# Patient Record
Sex: FEMALE | Race: OTHER
[De-identification: names, ages, dates, MRNs, and addresses within clinical notes are randomized per-mention and may not be internally consistent; named-entity substitution may affect disease eponyms.]

---

## 2020-01-22 ENCOUNTER — HOSPITAL ENCOUNTER (INPATIENT)
Dept: HOSPITAL 54 - ER | Age: 50
LOS: 16 days | Discharge: NURSING, REHAB OR LONG-TERM CARE | DRG: 876 | End: 2020-02-07
Attending: INTERNAL MEDICINE | Admitting: PSYCHIATRY & NEUROLOGY
Payer: MEDICARE

## 2020-01-22 VITALS — WEIGHT: 242 LBS | HEIGHT: 68 IN | BODY MASS INDEX: 36.68 KG/M2

## 2020-01-22 DIAGNOSIS — Z79.84: ICD-10-CM

## 2020-01-22 DIAGNOSIS — E03.9: ICD-10-CM

## 2020-01-22 DIAGNOSIS — L97.519: ICD-10-CM

## 2020-01-22 DIAGNOSIS — E11.65: ICD-10-CM

## 2020-01-22 DIAGNOSIS — F09: ICD-10-CM

## 2020-01-22 DIAGNOSIS — E11.40: ICD-10-CM

## 2020-01-22 DIAGNOSIS — I10: ICD-10-CM

## 2020-01-22 DIAGNOSIS — D72.829: ICD-10-CM

## 2020-01-22 DIAGNOSIS — E11.621: ICD-10-CM

## 2020-01-22 DIAGNOSIS — F25.0: Primary | ICD-10-CM

## 2020-01-22 DIAGNOSIS — E86.1: ICD-10-CM

## 2020-01-22 DIAGNOSIS — Z79.899: ICD-10-CM

## 2020-01-22 DIAGNOSIS — E87.1: ICD-10-CM

## 2020-01-22 LAB
ALBUMIN SERPL BCP-MCNC: 3.4 G/DL (ref 3.4–5)
ALP SERPL-CCNC: 109 U/L (ref 46–116)
ALT SERPL W P-5'-P-CCNC: 13 U/L (ref 12–78)
APAP SERPL-MCNC: < 10 UG/ML (ref 10–30)
AST SERPL W P-5'-P-CCNC: 10 U/L (ref 15–37)
BASOPHILS # BLD AUTO: 0 /CMM (ref 0–0.2)
BASOPHILS NFR BLD AUTO: 0.3 % (ref 0–2)
BILIRUB DIRECT SERPL-MCNC: 0 MG/DL (ref 0–0.2)
BILIRUB SERPL-MCNC: 0.2 MG/DL (ref 0.2–1)
BUN SERPL-MCNC: 16 MG/DL (ref 7–18)
CALCIUM SERPL-MCNC: 9.2 MG/DL (ref 8.5–10.1)
CHLORIDE SERPL-SCNC: 94 MMOL/L (ref 98–107)
CO2 SERPL-SCNC: 30 MMOL/L (ref 21–32)
CREAT SERPL-MCNC: 1 MG/DL (ref 0.6–1.3)
EOSINOPHIL NFR BLD AUTO: 2.9 % (ref 0–6)
ETHANOL SERPL-MCNC: < 3 MG/DL (ref 0–0)
GLUCOSE SERPL-MCNC: 134 MG/DL (ref 74–106)
HCT VFR BLD AUTO: 33 % (ref 33–45)
HGB BLD-MCNC: 10.3 G/DL (ref 11.5–14.8)
LYMPHOCYTES NFR BLD AUTO: 2.6 /CMM (ref 0.8–4.8)
LYMPHOCYTES NFR BLD AUTO: 22.2 % (ref 20–44)
MCHC RBC AUTO-ENTMCNC: 32 G/DL (ref 31–36)
MCV RBC AUTO: 78 FL (ref 82–100)
MONOCYTES NFR BLD AUTO: 1.2 /CMM (ref 0.1–1.3)
MONOCYTES NFR BLD AUTO: 10.2 % (ref 2–12)
NEUTROPHILS # BLD AUTO: 7.4 /CMM (ref 1.8–8.9)
NEUTROPHILS NFR BLD AUTO: 64.4 % (ref 43–81)
PH UR STRIP: 6.5 [PH] (ref 5–8)
PLATELET # BLD AUTO: 321 /CMM (ref 150–450)
POTASSIUM SERPL-SCNC: 3.8 MMOL/L (ref 3.5–5.1)
PROT SERPL-MCNC: 8.4 G/DL (ref 6.4–8.2)
RBC # BLD AUTO: 4.19 MIL/UL (ref 4–5.2)
SALICYLATES SERPL-MCNC: 0.2 MG/DL (ref 2.8–20)
SODIUM SERPL-SCNC: 129 MMOL/L (ref 136–145)
UROBILINOGEN UR STRIP-MCNC: 0.2 EU/DL
WBC NRBC COR # BLD AUTO: 11.5 K/UL (ref 4.3–11)

## 2020-01-22 PROCEDURE — G0480 DRUG TEST DEF 1-7 CLASSES: HCPCS

## 2020-01-22 RX ADMIN — TEMAZEPAM PRN MG: 7.5 CAPSULE ORAL at 23:57

## 2020-01-22 NOTE — NUR
PT TRANSPORTED TO UNIT ON GURNET WITH EMT AT BEDSIDE. PT IS STABLE FOPR 
TRANSPORT. NAD NOTED. IV LINE DISCONTINUED CATH INTACT.

## 2020-01-22 NOTE — NUR
GPS RN ADMISSION NOTES:

RECEIVED 48 Y/O FEMALE PATIENT FROM ER DEPARTMENT. PATIENT ARRIVED ON THIS UNIT AT 2045 VIA 
STRETCHER BY 1 NURSE. PATIENT ADMITTED ON A 5150 HOLD FOR DTS AND GD. PER HOLD PATIENT 
ARRIVED TO [facility] GPS UNIT DUE TO UNCONTROLLABLE CRYING AND RAMBLING. PT WAS REPORTED GOING IN 
OTHER PTS ROOM WITH NO SENSE OF PERSONAL BOUNDARY. PT HAS HISTORY OF DELUSIONAL THOUGHTS AND 
CONFUSION. STAFF FROM THE FACILITY THE PT WAS FROM WERE CONCERNED FOR PT SAFETY DUE TO HER 
MENTAL CONDITION. UPON FACE TO FACE ASSESSMENT PATIENT NOTED ANXIOUS, COOPERATIVE, 
HYPERVERBAL, DEPRESSED, FORGETFUL, CONFUSED, AND NEEDY .  PT DENIES S/I AND H/I AT THIS 
TIME. PATIENT IS CURRENTLY LYING IN BED AWAKE. NO S/S OF RESP DISTRESS. BREATHING EVEN AND 
UNLABORED. PT HAS NO S/S OF PAIN. PT IS ALERT AND ORIENTED X 3 ON ROOM AIR. PATIENT HAS NO 
NEEDS AT THIS TIME. PATIENT UNABLE TO SIGN PAPERWORK DUE TO CONDITION. PATIENT ADVISED OF 
HIS HOLD AND PATIENT RIGHTS HANDBOOK GIVEN. PATIENTS BELONGINGS WERE INVENTORIED AND CHECKED 
FOR CONTRABAND. PATIENT ADVANCED DIRECTIVE PREFERENCES AND NECESSARY PAPERWORK COMPLETED. 
PATIENT SKIN ASSESSMENT COMPLETED WITH PICTURES TAKEN IN PTS CHART. PT NOTED WITH CALLOUS ON 
BOTH RIGHT AND LEFT FOOT THUMB. WOUND CONSULT ORDERED FOR THE MORNING FOR FURTHER 
ASSESSMENT.  PT ORIENTED TO ROOM, FLOOR, AND STAFF WITH ALL QUESTIONS ANSWERED. PATIENT 
EDUCATED ON THE USE OF CALL BELL. PATIENTS SIDERAILS ARE UP X2 FOR SAFETY. INITIAL BLOOD 
SUGAR CHECK DONE. MRSA SWAB DONE IN THE ER DEPARTMENT. NOTIFIED PSYCH DR FLYNN REGARDING 
PT ADMISSION. MED RECON ADVISED TO [facility] ON CALL . PTS SISTER (MAGDALENO) AWARE OF PT 
ADMISSION VIA TELEPHONE NUMBER [phone]. PATIENTS BED LOCKED, LOW AND I WILL CONTINUE TO 
MONITOR THIS PATIENT Q15 MIN WITH THE HELP OF THE STAFF TO MAINTAIN SAFETY.

## 2020-01-22 NOTE — NUR
SUSANA FERGUSON 49 YEAR OLD FEMALE FROM A FACILITY FOR MEDICAL CLEARANCE TO BE PLACED ON 
A HOLD. ALERT AND ORIENTED X2 BREATHING EVEN AND UNLABORED. PT SEEM TO BE IN 
DISTRESS AND CRYING. PER AMBULANCE THAT IS HER BASAE LINE. WAITING TO BE SEEN 
BY MD.

## 2020-01-23 VITALS — SYSTOLIC BLOOD PRESSURE: 166 MMHG | DIASTOLIC BLOOD PRESSURE: 75 MMHG

## 2020-01-23 VITALS — SYSTOLIC BLOOD PRESSURE: 150 MMHG | DIASTOLIC BLOOD PRESSURE: 90 MMHG

## 2020-01-23 VITALS — DIASTOLIC BLOOD PRESSURE: 57 MMHG | SYSTOLIC BLOOD PRESSURE: 124 MMHG

## 2020-01-23 LAB
ALBUMIN SERPL BCP-MCNC: 3.2 G/DL (ref 3.4–5)
ALP SERPL-CCNC: 106 U/L (ref 46–116)
ALT SERPL W P-5'-P-CCNC: 16 U/L (ref 12–78)
AST SERPL W P-5'-P-CCNC: 10 U/L (ref 15–37)
BILIRUB SERPL-MCNC: 0.2 MG/DL (ref 0.2–1)
BUN SERPL-MCNC: 13 MG/DL (ref 7–18)
CALCIUM SERPL-MCNC: 9.6 MG/DL (ref 8.5–10.1)
CHLORIDE SERPL-SCNC: 102 MMOL/L (ref 98–107)
CHOLEST SERPL-MCNC: 122 MG/DL (ref ?–200)
CO2 SERPL-SCNC: 29 MMOL/L (ref 21–32)
CREAT SERPL-MCNC: 0.7 MG/DL (ref 0.6–1.3)
GLUCOSE SERPL-MCNC: 150 MG/DL (ref 74–106)
HDLC SERPL-MCNC: 45 MG/DL (ref 40–60)
LDLC SERPL DIRECT ASSAY-MCNC: 62 MG/DL (ref 0–99)
MAGNESIUM SERPL-MCNC: 1.6 MG/DL (ref 1.8–2.4)
PHOSPHATE SERPL-MCNC: 4.6 MG/DL (ref 2.5–4.9)
POTASSIUM SERPL-SCNC: 4.5 MMOL/L (ref 3.5–5.1)
PROT SERPL-MCNC: 8.1 G/DL (ref 6.4–8.2)
SODIUM SERPL-SCNC: 138 MMOL/L (ref 136–145)
TRIGL SERPL-MCNC: 104 MG/DL (ref 30–150)
TSH SERPL DL<=0.005 MIU/L-ACNC: 7.12 UIU/ML (ref 0.36–3.74)
URATE SERPL-MCNC: 4.6 MG/DL (ref 2.6–7.2)

## 2020-01-23 RX ADMIN — INSULIN GLARGINE SCH UNIT: 100 INJECTION, SOLUTION SUBCUTANEOUS at 22:43

## 2020-01-23 RX ADMIN — LOSARTAN POTASSIUM SCH MG: 50 TABLET, FILM COATED ORAL at 08:34

## 2020-01-23 RX ADMIN — METFORMIN HYDROCHLORIDE SCH MG: 500 TABLET, FILM COATED ORAL at 08:33

## 2020-01-23 RX ADMIN — GLIPIZIDE SCH MG: 2.5 TABLET, EXTENDED RELEASE ORAL at 08:34

## 2020-01-23 RX ADMIN — ACETAMINOPHEN PRN MG: 325 TABLET ORAL at 08:33

## 2020-01-23 RX ADMIN — POTASSIUM CHLORIDE SCH MEQ: 1500 TABLET, EXTENDED RELEASE ORAL at 08:33

## 2020-01-23 RX ADMIN — LORAZEPAM PRN MG: 1 TABLET ORAL at 18:46

## 2020-01-23 RX ADMIN — DIVALPROEX SODIUM SCH MG: 500 TABLET, DELAYED RELEASE ORAL at 21:44

## 2020-01-23 RX ADMIN — LEVOTHYROXINE SODIUM SCH MCG: 137 TABLET ORAL at 08:34

## 2020-01-23 RX ADMIN — TEMAZEPAM PRN MG: 7.5 CAPSULE ORAL at 22:47

## 2020-01-23 RX ADMIN — LORAZEPAM PRN MG: 1 TABLET ORAL at 21:54

## 2020-01-23 RX ADMIN — GLIPIZIDE SCH MG: 2.5 TABLET, EXTENDED RELEASE ORAL at 16:16

## 2020-01-23 RX ADMIN — HALOPERIDOL SCH MG: 1 TABLET ORAL at 14:23

## 2020-01-23 RX ADMIN — DIVALPROEX SODIUM SCH MG: 250 TABLET, DELAYED RELEASE ORAL at 16:16

## 2020-01-23 RX ADMIN — BENZTROPINE MESYLATE SCH MG: 1 TABLET ORAL at 16:16

## 2020-01-23 RX ADMIN — LINAGLIPTIN SCH MG: 5 TABLET, FILM COATED ORAL at 08:34

## 2020-01-23 RX ADMIN — METFORMIN HYDROCHLORIDE SCH MG: 500 TABLET, FILM COATED ORAL at 17:15

## 2020-01-23 RX ADMIN — HALOPERIDOL SCH MG: 1 TABLET ORAL at 16:16

## 2020-01-23 RX ADMIN — PANTOPRAZOLE SODIUM SCH MG: 40 TABLET, DELAYED RELEASE ORAL at 08:34

## 2020-01-23 RX ADMIN — INSULIN GLARGINE SCH UNIT: 100 INJECTION, SOLUTION SUBCUTANEOUS at 00:00

## 2020-01-23 RX ADMIN — FERROUS SULFATE TAB 325 MG (65 MG ELEMENTAL FE) SCH MG: 325 (65 FE) TAB at 08:33

## 2020-01-23 RX ADMIN — OLANZAPINE SCH MG: 5 TABLET, FILM COATED ORAL at 14:23

## 2020-01-23 RX ADMIN — LITHIUM CARBONATE SCH MG: 300 TABLET, FILM COATED, EXTENDED RELEASE ORAL at 16:16

## 2020-01-23 RX ADMIN — OLANZAPINE SCH MG: 10 TABLET ORAL at 21:44

## 2020-01-23 RX ADMIN — TEMAZEPAM PRN MG: 7.5 CAPSULE ORAL at 21:37

## 2020-01-23 NOTE — NUR
FACILITY CONTACT: RADHA contacted [facility] [address] [place] [id number] [phone] and was unable to leave a voicemail for callback.

## 2020-01-23 NOTE — NUR
INITIAL DISCHARGE PLAN: Per sister Bridgette [phone] she wishes for pt to be discharged 
to a higher level of care such a SNF closer to [facility] but is willing to accept 
pt returning to [facility] [address] [place] [address] [id number] [phone] if SW is unable to find appropriate placement. RADHA will help form a safe and 
proper discharge in collaboration with MD.

## 2020-01-23 NOTE — NUR
FAMILY CONTACT: SW received a call from pts sister Bridgette [phone] providing SW with 
collateral information. Per sister she stated pt is originally from [place] [facility] and 
states that she wishes for pt to be sent back to [facility] but she does not have placement 
for pt. Sister stated that pt needs a higher level of care and states that [facility] [address] [place] [address] [phone] cannot properly 
manage pt. Sister stated that pt has been dual diagnosed as Developmentally Delayed and 
Schizophrenic however pt was not diagnosed as developmentally delayed before the age of 17 
due to pts father being in denial and refusing to seek help for pt. Therefore, she does not 
qualify for [facility] services. Sister stated that pt acts like a 8 year old in a 49 
year old body and has attempted several times to apply for conservatorship but has not been 
successful. SW informed her that she will consult with MD regarding appropriate placement 
and stated that in order for pt to be conserved she has to be residing in the county she 
originates from. SW also informed her that she will attempt to get pt placed closer to [facility] but informed her that the chances of pt being accepted to a SNF out of that area were 
low. Sister understood.

## 2020-01-23 NOTE — NUR
GPS RN NOTES:

PT REFUSED INSULIN LANTUS AS ORDERED 0000. PT STATED, "I WANT TO SLEEP. THANK YOU." EXPLAIN 
RISKS AND BENEFITS. STILL REFUSED X3. CONTINUE TO MONITOR.

## 2020-01-23 NOTE — NUR
GPS RN NOTES:

PT C/O UNABLE TO GO TO SLEEP. PT REQUESTED SLEEPING PILLS. OFFERED RESTORIL 7.5MG PO AS 
ORDERED. PT AGREED AND TOLERATED MEDICATION WELL. CONTINUE TO MONITOR.

## 2020-01-24 VITALS — SYSTOLIC BLOOD PRESSURE: 153 MMHG | DIASTOLIC BLOOD PRESSURE: 74 MMHG

## 2020-01-24 VITALS — DIASTOLIC BLOOD PRESSURE: 71 MMHG | SYSTOLIC BLOOD PRESSURE: 129 MMHG

## 2020-01-24 VITALS — SYSTOLIC BLOOD PRESSURE: 143 MMHG | DIASTOLIC BLOOD PRESSURE: 70 MMHG

## 2020-01-24 LAB
OSMOLALITY UR: 143 MOS/KG (ref 340–1090)
SODIUM UR-SCNC: 27 MMOL/L (ref 40–220)

## 2020-01-24 RX ADMIN — DIVALPROEX SODIUM SCH MG: 250 TABLET, DELAYED RELEASE ORAL at 08:28

## 2020-01-24 RX ADMIN — DIVALPROEX SODIUM SCH MG: 500 TABLET, DELAYED RELEASE ORAL at 22:22

## 2020-01-24 RX ADMIN — INSULIN HUMAN PRN UNIT: 100 INJECTION, SOLUTION PARENTERAL at 18:11

## 2020-01-24 RX ADMIN — HALOPERIDOL SCH MG: 1 TABLET ORAL at 08:27

## 2020-01-24 RX ADMIN — HALOPERIDOL SCH MG: 1 TABLET ORAL at 16:14

## 2020-01-24 RX ADMIN — GLIPIZIDE SCH MG: 2.5 TABLET, EXTENDED RELEASE ORAL at 16:13

## 2020-01-24 RX ADMIN — BENZTROPINE MESYLATE SCH MG: 1 TABLET ORAL at 08:29

## 2020-01-24 RX ADMIN — ALUMINUM HYDROXIDE, MAGNESIUM HYDROXIDE, AND SIMETHICONE PRN ML: 200; 200; 20 SUSPENSION ORAL at 12:07

## 2020-01-24 RX ADMIN — LORAZEPAM PRN MG: 1 TABLET ORAL at 08:29

## 2020-01-24 RX ADMIN — GLIPIZIDE SCH MG: 2.5 TABLET, EXTENDED RELEASE ORAL at 08:28

## 2020-01-24 RX ADMIN — LINAGLIPTIN SCH MG: 5 TABLET, FILM COATED ORAL at 08:28

## 2020-01-24 RX ADMIN — LOSARTAN POTASSIUM SCH MG: 50 TABLET, FILM COATED ORAL at 08:28

## 2020-01-24 RX ADMIN — INSULIN GLARGINE SCH UNIT: 100 INJECTION, SOLUTION SUBCUTANEOUS at 22:00

## 2020-01-24 RX ADMIN — DIVALPROEX SODIUM SCH MG: 250 TABLET, DELAYED RELEASE ORAL at 13:17

## 2020-01-24 RX ADMIN — LORAZEPAM PRN MG: 1 TABLET ORAL at 16:13

## 2020-01-24 RX ADMIN — METFORMIN HYDROCHLORIDE SCH MG: 500 TABLET, FILM COATED ORAL at 08:29

## 2020-01-24 RX ADMIN — FERROUS SULFATE TAB 325 MG (65 MG ELEMENTAL FE) SCH MG: 325 (65 FE) TAB at 08:28

## 2020-01-24 RX ADMIN — LITHIUM CARBONATE SCH MG: 300 TABLET, FILM COATED, EXTENDED RELEASE ORAL at 08:29

## 2020-01-24 RX ADMIN — Medication SCH EACH: at 17:31

## 2020-01-24 RX ADMIN — OLANZAPINE SCH MG: 5 TABLET, FILM COATED ORAL at 08:29

## 2020-01-24 RX ADMIN — POTASSIUM CHLORIDE SCH MEQ: 1500 TABLET, EXTENDED RELEASE ORAL at 08:27

## 2020-01-24 RX ADMIN — LITHIUM CARBONATE SCH MG: 300 TABLET, FILM COATED, EXTENDED RELEASE ORAL at 13:17

## 2020-01-24 RX ADMIN — HALOPERIDOL SCH MG: 1 TABLET ORAL at 13:17

## 2020-01-24 RX ADMIN — PANTOPRAZOLE SODIUM SCH MG: 40 TABLET, DELAYED RELEASE ORAL at 08:28

## 2020-01-24 RX ADMIN — Medication SCH EACH: at 11:41

## 2020-01-24 RX ADMIN — ACETAMINOPHEN PRN MG: 325 TABLET ORAL at 09:38

## 2020-01-24 RX ADMIN — Medication SCH EACH: at 22:21

## 2020-01-24 RX ADMIN — METFORMIN HYDROCHLORIDE SCH MG: 500 TABLET, FILM COATED ORAL at 18:14

## 2020-01-24 RX ADMIN — MUPIROCIN SCH GM: 20 OINTMENT TOPICAL at 21:23

## 2020-01-24 RX ADMIN — Medication SCH EACH: at 07:40

## 2020-01-24 RX ADMIN — BENZTROPINE MESYLATE SCH MG: 1 TABLET ORAL at 16:14

## 2020-01-24 RX ADMIN — LITHIUM CARBONATE SCH MG: 300 TABLET, FILM COATED, EXTENDED RELEASE ORAL at 16:13

## 2020-01-24 RX ADMIN — OLANZAPINE SCH MG: 10 TABLET ORAL at 22:22

## 2020-01-24 RX ADMIN — Medication SCH EACH: at 18:03

## 2020-01-24 RX ADMIN — INSULIN HUMAN PRN UNIT: 100 INJECTION, SOLUTION PARENTERAL at 22:13

## 2020-01-24 RX ADMIN — LEVOTHYROXINE SODIUM SCH MCG: 137 TABLET ORAL at 08:27

## 2020-01-24 RX ADMIN — BENZTROPINE MESYLATE SCH MG: 1 TABLET ORAL at 13:17

## 2020-01-24 NOTE — NUR
INDIVIDUAL MEETING: SW met with pt on this present day and discussed pts discharge plan, SW 
informed her that [facility] is not accepting her back due to her behavior and 
informed her that SW will be referring pt to [facility] in [place] pt stated that 
she wanted to be closer to her sister in [place] and began crying uncontrollably. Pt 
has impaired insight and judgement and was unable to process information provided to her. Pt 
asked SW the same question 5 times in less than a 5 minute span.

## 2020-01-24 NOTE — NUR
FACILITY CONTACT: RADHA contacted Nafisa,  at [facility] Address: [address] 
[place] [address]  Phone: [phone] who stated pt cannot return to her 
facility due to pt not being a good fit and needing a higher level of care. Nafisa, 
recommended pt be discharged to [facility] .

## 2020-01-24 NOTE — NUR
FAMILY CONTACT: RADHA contacted pts sister Bridgette [phone] and informed her [facility] Address: [address] [place] [address]  Phone: [phone] is not accepting 
pt back and stated that they wish for pt to be referred to [facility] in [place]. 
Sister stated that [place] was too far and that she wished for pt to be closer to her. SW 
provided her with [place], [place], [place], and [place] SNF placement options. 
Sister stated that those cities were perfect and agreed with SNF referrals.

## 2020-01-25 VITALS — SYSTOLIC BLOOD PRESSURE: 148 MMHG | DIASTOLIC BLOOD PRESSURE: 79 MMHG

## 2020-01-25 VITALS — DIASTOLIC BLOOD PRESSURE: 85 MMHG | SYSTOLIC BLOOD PRESSURE: 160 MMHG

## 2020-01-25 VITALS — SYSTOLIC BLOOD PRESSURE: 147 MMHG | DIASTOLIC BLOOD PRESSURE: 61 MMHG

## 2020-01-25 RX ADMIN — OLANZAPINE SCH MG: 10 TABLET ORAL at 21:13

## 2020-01-25 RX ADMIN — MUPIROCIN SCH GM: 20 OINTMENT TOPICAL at 21:17

## 2020-01-25 RX ADMIN — HALOPERIDOL SCH MG: 1 TABLET ORAL at 16:32

## 2020-01-25 RX ADMIN — INSULIN GLARGINE SCH UNIT: 100 INJECTION, SOLUTION SUBCUTANEOUS at 21:25

## 2020-01-25 RX ADMIN — ALUMINUM HYDROXIDE, MAGNESIUM HYDROXIDE, AND SIMETHICONE PRN ML: 200; 200; 20 SUSPENSION ORAL at 14:51

## 2020-01-25 RX ADMIN — DIVALPROEX SODIUM SCH MG: 250 TABLET, DELAYED RELEASE ORAL at 07:52

## 2020-01-25 RX ADMIN — POTASSIUM CHLORIDE SCH MEQ: 1500 TABLET, EXTENDED RELEASE ORAL at 08:41

## 2020-01-25 RX ADMIN — PANTOPRAZOLE SODIUM SCH MG: 40 TABLET, DELAYED RELEASE ORAL at 07:51

## 2020-01-25 RX ADMIN — LITHIUM CARBONATE SCH MG: 300 TABLET, FILM COATED, EXTENDED RELEASE ORAL at 16:32

## 2020-01-25 RX ADMIN — BENZTROPINE MESYLATE SCH MG: 1 TABLET ORAL at 13:20

## 2020-01-25 RX ADMIN — MUPIROCIN SCH GM: 20 OINTMENT TOPICAL at 09:37

## 2020-01-25 RX ADMIN — Medication SCH EACH: at 21:22

## 2020-01-25 RX ADMIN — INSULIN HUMAN PRN UNIT: 100 INJECTION, SOLUTION PARENTERAL at 21:24

## 2020-01-25 RX ADMIN — GLIPIZIDE SCH MG: 2.5 TABLET, EXTENDED RELEASE ORAL at 07:51

## 2020-01-25 RX ADMIN — GLIPIZIDE SCH MG: 2.5 TABLET, EXTENDED RELEASE ORAL at 16:32

## 2020-01-25 RX ADMIN — Medication SCH EACH: at 16:56

## 2020-01-25 RX ADMIN — ACETAMINOPHEN PRN MG: 325 TABLET ORAL at 08:11

## 2020-01-25 RX ADMIN — OLANZAPINE SCH MG: 5 TABLET, FILM COATED ORAL at 08:40

## 2020-01-25 RX ADMIN — HALOPERIDOL SCH MG: 1 TABLET ORAL at 13:20

## 2020-01-25 RX ADMIN — FERROUS SULFATE TAB 325 MG (65 MG ELEMENTAL FE) SCH MG: 325 (65 FE) TAB at 08:40

## 2020-01-25 RX ADMIN — LINAGLIPTIN SCH MG: 5 TABLET, FILM COATED ORAL at 08:40

## 2020-01-25 RX ADMIN — BENZTROPINE MESYLATE SCH MG: 1 TABLET ORAL at 08:39

## 2020-01-25 RX ADMIN — LITHIUM CARBONATE SCH MG: 300 TABLET, FILM COATED, EXTENDED RELEASE ORAL at 08:40

## 2020-01-25 RX ADMIN — ACETAMINOPHEN PRN MG: 325 TABLET ORAL at 14:51

## 2020-01-25 RX ADMIN — LOSARTAN POTASSIUM SCH MG: 50 TABLET, FILM COATED ORAL at 08:40

## 2020-01-25 RX ADMIN — LORAZEPAM PRN MG: 1 TABLET ORAL at 09:43

## 2020-01-25 RX ADMIN — METFORMIN HYDROCHLORIDE SCH MG: 500 TABLET, FILM COATED ORAL at 18:07

## 2020-01-25 RX ADMIN — DIVALPROEX SODIUM SCH MG: 500 TABLET, DELAYED RELEASE ORAL at 21:13

## 2020-01-25 RX ADMIN — LEVOTHYROXINE SODIUM SCH MCG: 137 TABLET ORAL at 07:52

## 2020-01-25 RX ADMIN — LITHIUM CARBONATE SCH MG: 300 TABLET, FILM COATED, EXTENDED RELEASE ORAL at 13:20

## 2020-01-25 RX ADMIN — BENZTROPINE MESYLATE SCH MG: 1 TABLET ORAL at 16:32

## 2020-01-25 RX ADMIN — TEMAZEPAM PRN MG: 7.5 CAPSULE ORAL at 21:14

## 2020-01-25 RX ADMIN — HALOPERIDOL SCH MG: 1 TABLET ORAL at 08:40

## 2020-01-25 RX ADMIN — Medication SCH EACH: at 07:51

## 2020-01-25 RX ADMIN — METFORMIN HYDROCHLORIDE SCH MG: 500 TABLET, FILM COATED ORAL at 08:41

## 2020-01-25 RX ADMIN — DIVALPROEX SODIUM SCH MG: 250 TABLET, DELAYED RELEASE ORAL at 13:20

## 2020-01-25 RX ADMIN — ACETAMINOPHEN PRN MG: 325 TABLET ORAL at 21:13

## 2020-01-25 RX ADMIN — Medication SCH EACH: at 11:53

## 2020-01-25 NOTE — NUR
PATIENT C/O OF INDIGESTION/NAUSEA AND BODY ACHE. PRN TYLENOL AND MAALOX ADMINISTERED AS 
ORDERED. WILL CONTINUE TO MONITOR Q15

## 2020-01-25 NOTE — NUR
GPS RN NOTE, RECEIVED PATIENT AWAKE AND IN BED, NO S/S OR COMPLAINTS OF PAIN AT THIS TIME.  
PATIENT IS DISPLAYING NO S/S OF APPARENT DISTRESS AT THIS TIME.  PATIENT BREATHING IS 
UNLABORED WITH EQUAL RISE AND FALL OF THE CHEST.  PATIENT IS ALERT AND ORIENTED X 3 ON ROOM 
AIR WITH A SPO2 95%.  PATIENT IS MED COMPLIANT, ANXIOUS AT TIMES, NEEDY, DEMANDING, LABILE, 
SUSPICIOUS, PARANOID, AND COOPERATIVE.  PATIENT DENIES SUICIDAL AND HOMICIDAL IDEATIONS AT 
THIS TIME.  PATIENT ASSISTED WITH TURNING AND REPOSITIONING Q2HR AND PRN FOR COMFORT AND 
CIRCULATION.  PATIENT HAS NO NEEDS AT THIS TIME.  PATIENT EDUCATED ON THE USE OF THE CALL 
BELL.  PATIENT BED SIDE RAILS UP X 2 FOR SAFETY.  PATIENT BED IS LOCKED, LOW, WITH BED ALARM 
ON.  WILL CONTINUE TO MONITOR THIS PATIENT Q15 MINUTES WITH THE HELP OF STAFF TO MAINTAIN 
SAFETY.

## 2020-01-25 NOTE — NUR
PATIENT COMPLAINING OF RACING THOUGHTS

PRESENTS WITH HYPERVERBAL SPEECH AND LABILE MOOD

ATIVAN ADMINISTERED PO 1MG AS ORDERED

## 2020-01-26 VITALS — DIASTOLIC BLOOD PRESSURE: 85 MMHG | SYSTOLIC BLOOD PRESSURE: 130 MMHG

## 2020-01-26 VITALS — SYSTOLIC BLOOD PRESSURE: 136 MMHG | DIASTOLIC BLOOD PRESSURE: 75 MMHG

## 2020-01-26 VITALS — SYSTOLIC BLOOD PRESSURE: 142 MMHG | DIASTOLIC BLOOD PRESSURE: 85 MMHG

## 2020-01-26 LAB — TSH SERPL DL<=0.005 MIU/L-ACNC: 5.7 UIU/ML (ref 0.36–3.74)

## 2020-01-26 RX ADMIN — METFORMIN HYDROCHLORIDE SCH MG: 500 TABLET, FILM COATED ORAL at 08:03

## 2020-01-26 RX ADMIN — METFORMIN HYDROCHLORIDE SCH MG: 500 TABLET, FILM COATED ORAL at 18:53

## 2020-01-26 RX ADMIN — LITHIUM CARBONATE SCH MG: 300 TABLET, FILM COATED, EXTENDED RELEASE ORAL at 16:10

## 2020-01-26 RX ADMIN — INSULIN GLARGINE SCH UNIT: 100 INJECTION, SOLUTION SUBCUTANEOUS at 22:19

## 2020-01-26 RX ADMIN — PANTOPRAZOLE SODIUM SCH MG: 40 TABLET, DELAYED RELEASE ORAL at 08:03

## 2020-01-26 RX ADMIN — LINAGLIPTIN SCH MG: 5 TABLET, FILM COATED ORAL at 08:04

## 2020-01-26 RX ADMIN — Medication SCH EACH: at 07:59

## 2020-01-26 RX ADMIN — LORAZEPAM PRN MG: 1 TABLET ORAL at 08:46

## 2020-01-26 RX ADMIN — LORAZEPAM PRN MG: 1 TABLET ORAL at 16:11

## 2020-01-26 RX ADMIN — ACETAMINOPHEN PRN MG: 325 TABLET ORAL at 08:46

## 2020-01-26 RX ADMIN — OLANZAPINE SCH MG: 5 TABLET, FILM COATED ORAL at 08:01

## 2020-01-26 RX ADMIN — Medication SCH EACH: at 21:24

## 2020-01-26 RX ADMIN — HALOPERIDOL SCH MG: 1 TABLET ORAL at 08:01

## 2020-01-26 RX ADMIN — MUPIROCIN SCH APPLIC: 20 OINTMENT TOPICAL at 21:24

## 2020-01-26 RX ADMIN — Medication SCH EACH: at 17:16

## 2020-01-26 RX ADMIN — BENZTROPINE MESYLATE SCH MG: 1 TABLET ORAL at 12:38

## 2020-01-26 RX ADMIN — MUPIROCIN SCH APPLIC: 20 OINTMENT TOPICAL at 08:52

## 2020-01-26 RX ADMIN — HALOPERIDOL SCH MG: 1 TABLET ORAL at 16:11

## 2020-01-26 RX ADMIN — GLIPIZIDE SCH MG: 2.5 TABLET, EXTENDED RELEASE ORAL at 16:11

## 2020-01-26 RX ADMIN — LITHIUM CARBONATE SCH MG: 300 TABLET, FILM COATED, EXTENDED RELEASE ORAL at 12:38

## 2020-01-26 RX ADMIN — LEVOTHYROXINE SODIUM SCH MCG: 137 TABLET ORAL at 08:00

## 2020-01-26 RX ADMIN — LOSARTAN POTASSIUM SCH MG: 50 TABLET, FILM COATED ORAL at 08:01

## 2020-01-26 RX ADMIN — INSULIN HUMAN PRN UNIT: 100 INJECTION, SOLUTION PARENTERAL at 17:17

## 2020-01-26 RX ADMIN — BENZTROPINE MESYLATE SCH MG: 1 TABLET ORAL at 16:10

## 2020-01-26 RX ADMIN — LITHIUM CARBONATE SCH MG: 300 TABLET, FILM COATED, EXTENDED RELEASE ORAL at 08:04

## 2020-01-26 RX ADMIN — GLIPIZIDE SCH MG: 2.5 TABLET, EXTENDED RELEASE ORAL at 08:02

## 2020-01-26 RX ADMIN — HALOPERIDOL SCH MG: 1 TABLET ORAL at 12:38

## 2020-01-26 RX ADMIN — POTASSIUM CHLORIDE SCH MEQ: 1500 TABLET, EXTENDED RELEASE ORAL at 08:04

## 2020-01-26 RX ADMIN — ALUMINUM HYDROXIDE, MAGNESIUM HYDROXIDE, AND SIMETHICONE PRN ML: 200; 200; 20 SUSPENSION ORAL at 10:30

## 2020-01-26 RX ADMIN — DIVALPROEX SODIUM SCH MG: 250 TABLET, DELAYED RELEASE ORAL at 12:38

## 2020-01-26 RX ADMIN — FERROUS SULFATE TAB 325 MG (65 MG ELEMENTAL FE) SCH MG: 325 (65 FE) TAB at 08:03

## 2020-01-26 RX ADMIN — INSULIN HUMAN PRN UNIT: 100 INJECTION, SOLUTION PARENTERAL at 22:16

## 2020-01-26 RX ADMIN — DIVALPROEX SODIUM SCH MG: 500 TABLET, DELAYED RELEASE ORAL at 21:25

## 2020-01-26 RX ADMIN — BENZTROPINE MESYLATE SCH MG: 1 TABLET ORAL at 08:01

## 2020-01-26 RX ADMIN — DIVALPROEX SODIUM SCH MG: 250 TABLET, DELAYED RELEASE ORAL at 08:02

## 2020-01-26 RX ADMIN — OLANZAPINE SCH MG: 10 TABLET ORAL at 21:25

## 2020-01-26 RX ADMIN — Medication SCH EACH: at 12:38

## 2020-01-27 VITALS — SYSTOLIC BLOOD PRESSURE: 135 MMHG | DIASTOLIC BLOOD PRESSURE: 84 MMHG

## 2020-01-27 VITALS — SYSTOLIC BLOOD PRESSURE: 146 MMHG | DIASTOLIC BLOOD PRESSURE: 81 MMHG

## 2020-01-27 VITALS — SYSTOLIC BLOOD PRESSURE: 144 MMHG | DIASTOLIC BLOOD PRESSURE: 87 MMHG

## 2020-01-27 RX ADMIN — BENZTROPINE MESYLATE SCH MG: 1 TABLET ORAL at 16:31

## 2020-01-27 RX ADMIN — Medication SCH EACH: at 21:14

## 2020-01-27 RX ADMIN — LITHIUM CARBONATE SCH MG: 300 TABLET, FILM COATED, EXTENDED RELEASE ORAL at 12:20

## 2020-01-27 RX ADMIN — METFORMIN HYDROCHLORIDE SCH MG: 500 TABLET, FILM COATED ORAL at 18:07

## 2020-01-27 RX ADMIN — ACETAMINOPHEN PRN MG: 325 TABLET ORAL at 08:43

## 2020-01-27 RX ADMIN — INSULIN GLARGINE SCH UNIT: 100 INJECTION, SOLUTION SUBCUTANEOUS at 22:12

## 2020-01-27 RX ADMIN — OLANZAPINE SCH MG: 5 TABLET, FILM COATED ORAL at 08:43

## 2020-01-27 RX ADMIN — OLANZAPINE SCH MG: 10 TABLET ORAL at 21:02

## 2020-01-27 RX ADMIN — PANTOPRAZOLE SODIUM SCH MG: 40 TABLET, DELAYED RELEASE ORAL at 08:13

## 2020-01-27 RX ADMIN — POTASSIUM CHLORIDE SCH MEQ: 1500 TABLET, EXTENDED RELEASE ORAL at 08:42

## 2020-01-27 RX ADMIN — DIVALPROEX SODIUM SCH MG: 250 TABLET, DELAYED RELEASE ORAL at 12:20

## 2020-01-27 RX ADMIN — LORAZEPAM PRN MG: 1 TABLET ORAL at 08:42

## 2020-01-27 RX ADMIN — LITHIUM CARBONATE SCH MG: 300 TABLET, FILM COATED, EXTENDED RELEASE ORAL at 08:42

## 2020-01-27 RX ADMIN — MUPIROCIN SCH APPLIC: 20 OINTMENT TOPICAL at 09:14

## 2020-01-27 RX ADMIN — LINAGLIPTIN SCH MG: 5 TABLET, FILM COATED ORAL at 08:43

## 2020-01-27 RX ADMIN — GLIPIZIDE SCH MG: 2.5 TABLET, EXTENDED RELEASE ORAL at 08:13

## 2020-01-27 RX ADMIN — ACETAMINOPHEN PRN MG: 325 TABLET ORAL at 15:00

## 2020-01-27 RX ADMIN — LOSARTAN POTASSIUM SCH MG: 50 TABLET, FILM COATED ORAL at 08:43

## 2020-01-27 RX ADMIN — DIVALPROEX SODIUM SCH MG: 500 TABLET, DELAYED RELEASE ORAL at 21:02

## 2020-01-27 RX ADMIN — HALOPERIDOL SCH MG: 1 TABLET ORAL at 16:30

## 2020-01-27 RX ADMIN — Medication SCH EACH: at 07:49

## 2020-01-27 RX ADMIN — METFORMIN HYDROCHLORIDE SCH MG: 500 TABLET, FILM COATED ORAL at 08:46

## 2020-01-27 RX ADMIN — HALOPERIDOL SCH MG: 1 TABLET ORAL at 12:20

## 2020-01-27 RX ADMIN — LORAZEPAM PRN MG: 1 TABLET ORAL at 15:00

## 2020-01-27 RX ADMIN — HALOPERIDOL SCH MG: 1 TABLET ORAL at 08:43

## 2020-01-27 RX ADMIN — GLIPIZIDE SCH MG: 2.5 TABLET, EXTENDED RELEASE ORAL at 16:30

## 2020-01-27 RX ADMIN — Medication SCH EACH: at 17:38

## 2020-01-27 RX ADMIN — MUPIROCIN SCH APPLIC: 20 OINTMENT TOPICAL at 21:02

## 2020-01-27 RX ADMIN — LEVOTHYROXINE SODIUM SCH MCG: 88 TABLET ORAL at 08:13

## 2020-01-27 RX ADMIN — INSULIN HUMAN PRN UNIT: 100 INJECTION, SOLUTION PARENTERAL at 21:14

## 2020-01-27 RX ADMIN — MAGNESIUM HYDROXIDE PRN ML: 400 SUSPENSION ORAL at 08:42

## 2020-01-27 RX ADMIN — Medication SCH EACH: at 11:59

## 2020-01-27 RX ADMIN — FERROUS SULFATE TAB 325 MG (65 MG ELEMENTAL FE) SCH MG: 325 (65 FE) TAB at 08:42

## 2020-01-27 RX ADMIN — BENZTROPINE MESYLATE SCH MG: 1 TABLET ORAL at 12:20

## 2020-01-27 RX ADMIN — BENZTROPINE MESYLATE SCH MG: 1 TABLET ORAL at 08:42

## 2020-01-27 RX ADMIN — NEOMYCIN AND POLYMYXIN B SULFATES AND BACITRACIN ZINC SCH GM: 400; 3.5; 5 OINTMENT TOPICAL at 13:00

## 2020-01-27 RX ADMIN — DIVALPROEX SODIUM SCH MG: 250 TABLET, DELAYED RELEASE ORAL at 08:13

## 2020-01-27 RX ADMIN — LITHIUM CARBONATE SCH MG: 300 TABLET, FILM COATED, EXTENDED RELEASE ORAL at 16:30

## 2020-01-27 NOTE — NUR
WOUND CARE CONSULT: PT PRESENTS WITH LARGE CALLUS TO RT PLANTAR GREAT TOE, PRESENT ON 
ADMISSION. RECOMMEND DPM CONSULT. DR RAMOS NOTIFED OF CONSULT REQUEST. PT IS CONTINENT AND 
INDEPENDENT WITH BED MOBILITY. WILL SEE PRN. CURRENT GREGORY SCORE IS 23.

## 2020-01-27 NOTE — NUR
RN NOTE: PT C/O HEADACHE AND ANXIETY. REQUESTING TYLENOL AND ATIVAN. TYLENOL 650MG PRN AND 
ATIVAN 1MG PRN GIVEN

## 2020-01-27 NOTE — NUR
GROUP NOTE: Pt unable to participate in group due to being developmentally delayed and 
unable to participate in a group setting due to pts childlike behaviors and inability to 
comprehend and process group topic. Pt crying uncontrollably and unable to calm herself 
down.

## 2020-01-28 VITALS — DIASTOLIC BLOOD PRESSURE: 70 MMHG | SYSTOLIC BLOOD PRESSURE: 145 MMHG

## 2020-01-28 VITALS — DIASTOLIC BLOOD PRESSURE: 82 MMHG | SYSTOLIC BLOOD PRESSURE: 135 MMHG

## 2020-01-28 VITALS — SYSTOLIC BLOOD PRESSURE: 160 MMHG | DIASTOLIC BLOOD PRESSURE: 80 MMHG

## 2020-01-28 RX ADMIN — Medication SCH EACH: at 21:51

## 2020-01-28 RX ADMIN — GLIPIZIDE SCH MG: 2.5 TABLET, EXTENDED RELEASE ORAL at 08:24

## 2020-01-28 RX ADMIN — LORAZEPAM PRN MG: 1 TABLET ORAL at 12:29

## 2020-01-28 RX ADMIN — METFORMIN HYDROCHLORIDE SCH MG: 500 TABLET, FILM COATED ORAL at 17:32

## 2020-01-28 RX ADMIN — GLIPIZIDE SCH MG: 2.5 TABLET, EXTENDED RELEASE ORAL at 16:21

## 2020-01-28 RX ADMIN — HALOPERIDOL SCH MG: 1 TABLET ORAL at 08:24

## 2020-01-28 RX ADMIN — DIVALPROEX SODIUM SCH MG: 500 TABLET, DELAYED RELEASE ORAL at 22:10

## 2020-01-28 RX ADMIN — DIVALPROEX SODIUM SCH MG: 250 TABLET, DELAYED RELEASE ORAL at 08:26

## 2020-01-28 RX ADMIN — OLANZAPINE SCH MG: 10 TABLET ORAL at 22:10

## 2020-01-28 RX ADMIN — LEVOTHYROXINE SODIUM SCH MCG: 88 TABLET ORAL at 08:23

## 2020-01-28 RX ADMIN — POTASSIUM CHLORIDE SCH MEQ: 1500 TABLET, EXTENDED RELEASE ORAL at 08:26

## 2020-01-28 RX ADMIN — LORAZEPAM PRN MG: 1 TABLET ORAL at 08:24

## 2020-01-28 RX ADMIN — MUPIROCIN SCH APPLIC: 20 OINTMENT TOPICAL at 08:57

## 2020-01-28 RX ADMIN — PANTOPRAZOLE SODIUM SCH MG: 40 TABLET, DELAYED RELEASE ORAL at 07:59

## 2020-01-28 RX ADMIN — FERROUS SULFATE TAB 325 MG (65 MG ELEMENTAL FE) SCH MG: 325 (65 FE) TAB at 08:24

## 2020-01-28 RX ADMIN — Medication SCH EACH: at 11:50

## 2020-01-28 RX ADMIN — METFORMIN HYDROCHLORIDE SCH MG: 500 TABLET, FILM COATED ORAL at 08:26

## 2020-01-28 RX ADMIN — MUPIROCIN SCH APPLIC: 20 OINTMENT TOPICAL at 21:41

## 2020-01-28 RX ADMIN — ALUMINUM HYDROXIDE, MAGNESIUM HYDROXIDE, AND SIMETHICONE PRN ML: 200; 200; 20 SUSPENSION ORAL at 08:55

## 2020-01-28 RX ADMIN — LITHIUM CARBONATE SCH MG: 300 TABLET, FILM COATED, EXTENDED RELEASE ORAL at 16:21

## 2020-01-28 RX ADMIN — Medication SCH EACH: at 17:31

## 2020-01-28 RX ADMIN — ACETAMINOPHEN PRN MG: 325 TABLET ORAL at 08:24

## 2020-01-28 RX ADMIN — INSULIN GLARGINE SCH UNIT: 100 INJECTION, SOLUTION SUBCUTANEOUS at 21:59

## 2020-01-28 RX ADMIN — NEOMYCIN AND POLYMYXIN B SULFATES AND BACITRACIN ZINC SCH GM: 400; 3.5; 5 OINTMENT TOPICAL at 08:57

## 2020-01-28 RX ADMIN — BENZTROPINE MESYLATE SCH MG: 1 TABLET ORAL at 16:21

## 2020-01-28 RX ADMIN — INSULIN HUMAN PRN UNIT: 100 INJECTION, SOLUTION PARENTERAL at 21:52

## 2020-01-28 RX ADMIN — INSULIN HUMAN PRN UNIT: 100 INJECTION, SOLUTION PARENTERAL at 07:26

## 2020-01-28 RX ADMIN — LITHIUM CARBONATE SCH MG: 300 TABLET, FILM COATED, EXTENDED RELEASE ORAL at 12:29

## 2020-01-28 RX ADMIN — LINAGLIPTIN SCH MG: 5 TABLET, FILM COATED ORAL at 08:26

## 2020-01-28 RX ADMIN — LITHIUM CARBONATE SCH MG: 300 TABLET, FILM COATED, EXTENDED RELEASE ORAL at 08:25

## 2020-01-28 RX ADMIN — MAGNESIUM HYDROXIDE PRN ML: 400 SUSPENSION ORAL at 16:21

## 2020-01-28 RX ADMIN — BENZTROPINE MESYLATE SCH MG: 1 TABLET ORAL at 08:26

## 2020-01-28 RX ADMIN — DIVALPROEX SODIUM SCH MG: 250 TABLET, DELAYED RELEASE ORAL at 12:29

## 2020-01-28 RX ADMIN — OLANZAPINE SCH MG: 5 TABLET, FILM COATED ORAL at 08:26

## 2020-01-28 RX ADMIN — LOSARTAN POTASSIUM SCH MG: 50 TABLET, FILM COATED ORAL at 08:26

## 2020-01-28 RX ADMIN — ACETAMINOPHEN PRN MG: 325 TABLET ORAL at 17:32

## 2020-01-28 RX ADMIN — Medication SCH EACH: at 07:23

## 2020-01-28 RX ADMIN — HALOPERIDOL SCH MG: 1 TABLET ORAL at 12:29

## 2020-01-28 RX ADMIN — BENZTROPINE MESYLATE SCH MG: 1 TABLET ORAL at 12:29

## 2020-01-28 RX ADMIN — INSULIN HUMAN PRN UNIT: 100 INJECTION, SOLUTION PARENTERAL at 11:53

## 2020-01-28 RX ADMIN — OLANZAPINE SCH MG: 5 TABLET, FILM COATED ORAL at 12:29

## 2020-01-28 RX ADMIN — HALOPERIDOL SCH MG: 1 TABLET ORAL at 16:21

## 2020-01-28 NOTE — NUR
FAMILY CONTACT: SW contacted pts sister Bridgette [phone] and informed her SW is unable 
to place pt in a SNF due to pt not having SNF Medicare days. Sister stated that that if 
there was no other placement option she wishes for pt to return back to [facility]. 
SW stated that Nafisa,  from [place] stated that she did not want pt to 
return, sister stated that was not true and states that she wishes for SW to send pt back if 
no other SNF option is available.

## 2020-01-28 NOTE — NUR
SNF REFERRAL: RADHA faxed SNF referrals to [facility]  Address: [address] 
[place] [address] Phone: [phone] Fax: [phone] and [facility] Address: [address] [place] [address] Phone: [phone] Fax: 
[phone] for review. 

-------------------------------------------------------------------------------

Addendum: 01/28/20 at 0945 by THEA GARCIA

-------------------------------------------------------------------------------

RADHA also referred to [facility] Address: [address] [place] [address] Phone: [phone] Fax: [phone] for review.

## 2020-01-28 NOTE — NUR
FAMILY CONTACT: RADHA contacted pts sister Bridgette [phone] and informed her [facility] is taking pt back. SW informed sister that pt needs to be out of the hospital for 60 
days in order for her Medicare SNF days to regenerate. Sister agreed with discharge plan.

## 2020-01-28 NOTE — NUR
FAMILY CONTACT: SW contacted pts sister Bridgette [phone] and informed her SW has faxed 
SNF referrals to [facility], [facility], and [facility] 
and [facility]. Sister agreed with placement options.

## 2020-01-28 NOTE — NUR
FACILITY CONTACT: RADHA contacted Nafisa,  at [facility] Address: [address] 
[place] [address]  Phone: [phone] and informed her pts sister wishes for 
pt to return back to her facility, Nafisa stated that she is willing to accept pt once she is 
stable for discharge.

## 2020-01-28 NOTE — NUR
RN NOTE: PT C/O INCREASED ANXIETY AND 6/10 HEADACHE. PT REQUESTING ATIVAN AND TYLENOL. PT 
MED WITH ATIVAN 1MG AND TYLENOL 650MG PRN.

## 2020-01-28 NOTE — NUR
FACILITY CONTACT: RADHA contacted Nafisa,  at [facility] Address: [address] 
[place] [address]  Phone: [phone] to inform her pt no longer has SNF days 
and no SNF will accept her. Nafisa stated that she cannot accept pt due to pts sister not 
wanting pt to return. Nafisa stated that she will try to place pt but stated she may not be 
able to.

## 2020-01-28 NOTE — NUR
SNF REFERRAL: SW received a call from Savi, admissions coordinator at [facility]  Address: [address], [place] [address] Phone: [phone] 
stating pt has zero SNF days and therefore are unable to accept.

## 2020-01-29 VITALS — DIASTOLIC BLOOD PRESSURE: 79 MMHG | SYSTOLIC BLOOD PRESSURE: 157 MMHG

## 2020-01-29 VITALS — DIASTOLIC BLOOD PRESSURE: 79 MMHG | SYSTOLIC BLOOD PRESSURE: 148 MMHG

## 2020-01-29 VITALS — SYSTOLIC BLOOD PRESSURE: 158 MMHG | DIASTOLIC BLOOD PRESSURE: 86 MMHG

## 2020-01-29 RX ADMIN — POTASSIUM CHLORIDE SCH MEQ: 1500 TABLET, EXTENDED RELEASE ORAL at 08:04

## 2020-01-29 RX ADMIN — Medication SCH EACH: at 07:36

## 2020-01-29 RX ADMIN — BENZTROPINE MESYLATE SCH MG: 1 TABLET ORAL at 08:05

## 2020-01-29 RX ADMIN — OLANZAPINE SCH MG: 10 TABLET ORAL at 21:45

## 2020-01-29 RX ADMIN — HALOPERIDOL SCH MG: 1 TABLET ORAL at 12:07

## 2020-01-29 RX ADMIN — LITHIUM CARBONATE SCH MG: 300 TABLET, FILM COATED, EXTENDED RELEASE ORAL at 08:04

## 2020-01-29 RX ADMIN — GLIPIZIDE SCH MG: 2.5 TABLET, EXTENDED RELEASE ORAL at 08:05

## 2020-01-29 RX ADMIN — HALOPERIDOL SCH MG: 1 TABLET ORAL at 16:24

## 2020-01-29 RX ADMIN — METFORMIN HYDROCHLORIDE SCH MG: 500 TABLET, FILM COATED ORAL at 08:03

## 2020-01-29 RX ADMIN — Medication SCH EACH: at 17:21

## 2020-01-29 RX ADMIN — ACETAMINOPHEN PRN MG: 325 TABLET ORAL at 08:03

## 2020-01-29 RX ADMIN — INSULIN HUMAN PRN UNIT: 100 INJECTION, SOLUTION PARENTERAL at 21:33

## 2020-01-29 RX ADMIN — PANTOPRAZOLE SODIUM SCH MG: 40 TABLET, DELAYED RELEASE ORAL at 08:04

## 2020-01-29 RX ADMIN — ACETAMINOPHEN PRN MG: 325 TABLET ORAL at 16:29

## 2020-01-29 RX ADMIN — HALOPERIDOL SCH MG: 1 TABLET ORAL at 08:04

## 2020-01-29 RX ADMIN — METFORMIN HYDROCHLORIDE SCH MG: 500 TABLET, FILM COATED ORAL at 17:43

## 2020-01-29 RX ADMIN — MUPIROCIN SCH APPLIC: 20 OINTMENT TOPICAL at 20:35

## 2020-01-29 RX ADMIN — Medication SCH EACH: at 21:32

## 2020-01-29 RX ADMIN — LORAZEPAM PRN MG: 1 TABLET ORAL at 08:04

## 2020-01-29 RX ADMIN — LORAZEPAM PRN MG: 1 TABLET ORAL at 17:22

## 2020-01-29 RX ADMIN — INSULIN HUMAN PRN UNIT: 100 INJECTION, SOLUTION PARENTERAL at 11:51

## 2020-01-29 RX ADMIN — INSULIN GLARGINE SCH UNIT: 100 INJECTION, SOLUTION SUBCUTANEOUS at 21:34

## 2020-01-29 RX ADMIN — LINAGLIPTIN SCH MG: 5 TABLET, FILM COATED ORAL at 08:04

## 2020-01-29 RX ADMIN — LITHIUM CARBONATE SCH MG: 300 TABLET, FILM COATED, EXTENDED RELEASE ORAL at 16:24

## 2020-01-29 RX ADMIN — ALUMINUM HYDROXIDE, MAGNESIUM HYDROXIDE, AND SIMETHICONE PRN ML: 200; 200; 20 SUSPENSION ORAL at 17:56

## 2020-01-29 RX ADMIN — LOSARTAN POTASSIUM SCH MG: 50 TABLET, FILM COATED ORAL at 08:04

## 2020-01-29 RX ADMIN — NEOMYCIN AND POLYMYXIN B SULFATES AND BACITRACIN ZINC SCH GM: 400; 3.5; 5 OINTMENT TOPICAL at 08:06

## 2020-01-29 RX ADMIN — DIVALPROEX SODIUM SCH MG: 250 TABLET, DELAYED RELEASE ORAL at 12:06

## 2020-01-29 RX ADMIN — Medication SCH EACH: at 11:43

## 2020-01-29 RX ADMIN — BENZTROPINE MESYLATE SCH MG: 1 TABLET ORAL at 16:24

## 2020-01-29 RX ADMIN — OLANZAPINE SCH MG: 5 TABLET, FILM COATED ORAL at 12:06

## 2020-01-29 RX ADMIN — BENZTROPINE MESYLATE SCH MG: 1 TABLET ORAL at 12:07

## 2020-01-29 RX ADMIN — OLANZAPINE SCH MG: 5 TABLET, FILM COATED ORAL at 08:04

## 2020-01-29 RX ADMIN — FERROUS SULFATE TAB 325 MG (65 MG ELEMENTAL FE) SCH MG: 325 (65 FE) TAB at 08:05

## 2020-01-29 RX ADMIN — DIVALPROEX SODIUM SCH MG: 500 TABLET, DELAYED RELEASE ORAL at 21:45

## 2020-01-29 RX ADMIN — LITHIUM CARBONATE SCH MG: 300 TABLET, FILM COATED, EXTENDED RELEASE ORAL at 12:07

## 2020-01-29 RX ADMIN — MUPIROCIN SCH APPLIC: 20 OINTMENT TOPICAL at 08:06

## 2020-01-29 RX ADMIN — DIVALPROEX SODIUM SCH MG: 250 TABLET, DELAYED RELEASE ORAL at 08:03

## 2020-01-29 RX ADMIN — LEVOTHYROXINE SODIUM SCH MCG: 88 TABLET ORAL at 08:04

## 2020-01-29 RX ADMIN — GLIPIZIDE SCH MG: 2.5 TABLET, EXTENDED RELEASE ORAL at 16:24

## 2020-01-29 RX ADMIN — LORAZEPAM PRN MG: 1 TABLET ORAL at 12:07

## 2020-01-29 RX ADMIN — ALUMINUM HYDROXIDE, MAGNESIUM HYDROXIDE, AND SIMETHICONE PRN ML: 200; 200; 20 SUSPENSION ORAL at 11:22

## 2020-01-29 NOTE — NUR
GPS RN NOTES:

PT BLOOD SUGAR 111. PER SLIDING SCALE NO REGULAR INSULIN GIVEN. PT REFUSED INSULIN LANTUS 30 
UNITS AS ORDERED. PT CRYING AND STATED, " SLEEP. I WANT TO SLEEP." EXPLAINED RISKS AND 
BENEFITS. STILL REFUSED X3 CONTINUE TO MONITOR.

## 2020-01-29 NOTE — NUR
gps rn note

ativan 1mg PO administered for anxiety. patient hyperverbal and intermittently crying. 
requested anxiety medication. administered as ordered

## 2020-01-29 NOTE — NUR
patient continues to exhibit anxiety and requested ativan. ativan 1mg PO PRN administered as 
ordered

## 2020-01-30 VITALS — DIASTOLIC BLOOD PRESSURE: 93 MMHG | SYSTOLIC BLOOD PRESSURE: 159 MMHG

## 2020-01-30 VITALS — DIASTOLIC BLOOD PRESSURE: 99 MMHG | SYSTOLIC BLOOD PRESSURE: 153 MMHG

## 2020-01-30 VITALS — SYSTOLIC BLOOD PRESSURE: 93 MMHG

## 2020-01-30 VITALS — SYSTOLIC BLOOD PRESSURE: 151 MMHG | DIASTOLIC BLOOD PRESSURE: 80 MMHG

## 2020-01-30 LAB
BASOPHILS # BLD AUTO: 0 /CMM (ref 0–0.2)
BASOPHILS NFR BLD AUTO: 0.4 % (ref 0–2)
BUN SERPL-MCNC: 14 MG/DL (ref 7–18)
CALCIUM SERPL-MCNC: 9.5 MG/DL (ref 8.5–10.1)
CHLORIDE SERPL-SCNC: 99 MMOL/L (ref 98–107)
CO2 SERPL-SCNC: 29 MMOL/L (ref 21–32)
CREAT SERPL-MCNC: 0.7 MG/DL (ref 0.6–1.3)
EOSINOPHIL NFR BLD AUTO: 2.4 % (ref 0–6)
GLUCOSE SERPL-MCNC: 129 MG/DL (ref 74–106)
HCT VFR BLD AUTO: 35 % (ref 33–45)
HGB BLD-MCNC: 11 G/DL (ref 11.5–14.8)
LYMPHOCYTES NFR BLD AUTO: 2.4 /CMM (ref 0.8–4.8)
LYMPHOCYTES NFR BLD AUTO: 22.2 % (ref 20–44)
MAGNESIUM SERPL-MCNC: 1.7 MG/DL (ref 1.8–2.4)
MCHC RBC AUTO-ENTMCNC: 32 G/DL (ref 31–36)
MCV RBC AUTO: 78 FL (ref 82–100)
MONOCYTES NFR BLD AUTO: 1.2 /CMM (ref 0.1–1.3)
MONOCYTES NFR BLD AUTO: 11.3 % (ref 2–12)
NEUTROPHILS # BLD AUTO: 6.7 /CMM (ref 1.8–8.9)
NEUTROPHILS NFR BLD AUTO: 63.7 % (ref 43–81)
PHOSPHATE SERPL-MCNC: 4.4 MG/DL (ref 2.5–4.9)
PLATELET # BLD AUTO: 340 /CMM (ref 150–450)
POTASSIUM SERPL-SCNC: 5.2 MMOL/L (ref 3.5–5.1)
RBC # BLD AUTO: 4.46 MIL/UL (ref 4–5.2)
SODIUM SERPL-SCNC: 133 MMOL/L (ref 136–145)
WBC NRBC COR # BLD AUTO: 10.6 K/UL (ref 4.3–11)

## 2020-01-30 RX ADMIN — INSULIN HUMAN PRN UNIT: 100 INJECTION, SOLUTION PARENTERAL at 12:11

## 2020-01-30 RX ADMIN — NEOMYCIN AND POLYMYXIN B SULFATES AND BACITRACIN ZINC SCH GM: 400; 3.5; 5 OINTMENT TOPICAL at 09:10

## 2020-01-30 RX ADMIN — ALUMINUM HYDROXIDE, MAGNESIUM HYDROXIDE, AND SIMETHICONE PRN ML: 200; 200; 20 SUSPENSION ORAL at 16:49

## 2020-01-30 RX ADMIN — METFORMIN HYDROCHLORIDE SCH MG: 500 TABLET, FILM COATED ORAL at 09:10

## 2020-01-30 RX ADMIN — BENZTROPINE MESYLATE SCH MG: 1 TABLET ORAL at 09:10

## 2020-01-30 RX ADMIN — ACETAMINOPHEN PRN MG: 325 TABLET ORAL at 12:03

## 2020-01-30 RX ADMIN — Medication SCH EACH: at 16:51

## 2020-01-30 RX ADMIN — LITHIUM CARBONATE SCH MG: 300 TABLET, FILM COATED, EXTENDED RELEASE ORAL at 12:06

## 2020-01-30 RX ADMIN — INSULIN GLARGINE SCH UNIT: 100 INJECTION, SOLUTION SUBCUTANEOUS at 21:19

## 2020-01-30 RX ADMIN — LORAZEPAM PRN MG: 1 TABLET ORAL at 11:38

## 2020-01-30 RX ADMIN — ALUMINUM HYDROXIDE, MAGNESIUM HYDROXIDE, AND SIMETHICONE PRN ML: 200; 200; 20 SUSPENSION ORAL at 12:03

## 2020-01-30 RX ADMIN — BENZTROPINE MESYLATE SCH MG: 1 TABLET ORAL at 16:50

## 2020-01-30 RX ADMIN — DIVALPROEX SODIUM SCH MG: 250 TABLET, DELAYED RELEASE ORAL at 09:10

## 2020-01-30 RX ADMIN — BENZTROPINE MESYLATE SCH MG: 1 TABLET ORAL at 12:06

## 2020-01-30 RX ADMIN — MUPIROCIN SCH APPLIC: 20 OINTMENT TOPICAL at 09:05

## 2020-01-30 RX ADMIN — HALOPERIDOL SCH MG: 1 TABLET ORAL at 16:50

## 2020-01-30 RX ADMIN — HALOPERIDOL SCH MG: 1 TABLET ORAL at 12:08

## 2020-01-30 RX ADMIN — Medication SCH EACH: at 21:15

## 2020-01-30 RX ADMIN — MUPIROCIN SCH APPLIC: 20 OINTMENT TOPICAL at 21:19

## 2020-01-30 RX ADMIN — Medication SCH EACH: at 11:34

## 2020-01-30 RX ADMIN — DIVALPROEX SODIUM SCH MG: 500 TABLET, DELAYED RELEASE ORAL at 21:20

## 2020-01-30 RX ADMIN — HALOPERIDOL SCH MG: 1 TABLET ORAL at 09:10

## 2020-01-30 RX ADMIN — GLIPIZIDE SCH MG: 2.5 TABLET, EXTENDED RELEASE ORAL at 09:11

## 2020-01-30 RX ADMIN — LOSARTAN POTASSIUM SCH MG: 50 TABLET, FILM COATED ORAL at 09:12

## 2020-01-30 RX ADMIN — POTASSIUM CHLORIDE SCH MEQ: 1500 TABLET, EXTENDED RELEASE ORAL at 09:11

## 2020-01-30 RX ADMIN — Medication SCH EACH: at 09:00

## 2020-01-30 RX ADMIN — FERROUS SULFATE TAB 325 MG (65 MG ELEMENTAL FE) SCH MG: 325 (65 FE) TAB at 09:10

## 2020-01-30 RX ADMIN — DIVALPROEX SODIUM SCH MG: 250 TABLET, DELAYED RELEASE ORAL at 12:06

## 2020-01-30 RX ADMIN — LORAZEPAM PRN MG: 1 TABLET ORAL at 15:52

## 2020-01-30 RX ADMIN — INSULIN HUMAN PRN UNIT: 100 INJECTION, SOLUTION PARENTERAL at 09:02

## 2020-01-30 RX ADMIN — LITHIUM CARBONATE SCH MG: 150 CAPSULE, GELATIN COATED ORAL at 16:50

## 2020-01-30 RX ADMIN — PANTOPRAZOLE SODIUM SCH MG: 40 TABLET, DELAYED RELEASE ORAL at 09:11

## 2020-01-30 RX ADMIN — LINAGLIPTIN SCH MG: 5 TABLET, FILM COATED ORAL at 09:11

## 2020-01-30 RX ADMIN — OLANZAPINE SCH MG: 10 TABLET ORAL at 21:20

## 2020-01-30 RX ADMIN — LITHIUM CARBONATE SCH MG: 300 TABLET, FILM COATED, EXTENDED RELEASE ORAL at 09:11

## 2020-01-30 RX ADMIN — OLANZAPINE SCH MG: 5 TABLET, FILM COATED ORAL at 12:06

## 2020-01-30 RX ADMIN — GLIPIZIDE SCH MG: 2.5 TABLET, EXTENDED RELEASE ORAL at 15:50

## 2020-01-30 RX ADMIN — OLANZAPINE SCH MG: 5 TABLET, FILM COATED ORAL at 09:11

## 2020-01-30 RX ADMIN — LEVOTHYROXINE SODIUM SCH MCG: 88 TABLET ORAL at 09:11

## 2020-01-30 RX ADMIN — METFORMIN HYDROCHLORIDE SCH MG: 500 TABLET, FILM COATED ORAL at 18:03

## 2020-01-30 NOTE — NUR
RN NOTES

 ADMINISTERED  ATIVAN 1 MG PO PRN FOR ANXIETY, CRYING, DEPRESS,  IRRITABLE, V/S TAKEN 
/93, P-94, CONTINUED MONITORING.

## 2020-01-30 NOTE — NUR
GPS RN NOTE, RECEIVED PATIENT AWAKE AND IN BED, NO S/S OR COMPLAINTS OF PAIN AT THIS TIME.  
PATIENT IS DISPLAYING NO S/S OF APPARENT DISTRESS AT THIS TIME.  PATIENT BREATHING IS 
UNLABORED WITH EQUAL RISE AND FALL OF THE CHEST.  PATIENT IS ALERT AND ORIENTED X 2-3 ON 
ROOM AIR WITH A SPO2 95%.  PATIENT IS MED COMPLIANT, ANXIOUS AT TIMES, ATTENTION SEEKING, 
SUSPICIOUS, PARANOID, MAKES NEEDS KNOWN, AND COOPERATIVE.  PATIENT DENIES SUICIDAL AND 
HOMICIDAL IDEATIONS AT THIS TIME.  PATIENT ASSISTED WITH TURNING AND REPOSITIONING Q2HR AND 
PRN FOR COMFORT AND CIRCULATION.  PATIENT HAS NO NEEDS AT THIS TIME.  PATIENT EDUCATED ON 
THE USE OF THE CALL BELL.  PATIENT BED SIDE RAILS UP X 2 FOR SAFETY.  PATIENT BED IS LOCKED, 
LOW, WITH BED ALARM ON.  WILL CONTINUE TO MONITOR THIS PATIENT Q15 MINUTES WITH THE HELP OF 
STAFF TO MAINTAIN SAFETY.

## 2020-01-30 NOTE — NUR
RN NOTES

 ADMINISTERED ATIVAN 1 MG PO PRN FOR ANXIETY, IRRITABLE , V/S TAKEN  /80, P-95, 
CONTINUED MONITORING.

## 2020-01-30 NOTE — NUR
GROUP NOTE: Pt unable to participate in group due to being developmentally delayed and 
unable to participate in a group setting due to pts childlike behaviors and inability to 
comprehend and process group topic. Pt crying uncontrollably and unable to calm herself 
down. Pt does not have boundaries and no sense of personal space awareness.

## 2020-01-30 NOTE — NUR
RN NOTES

 ADMINISTERED MAALOX 30 ML PO PRN FOR STOMACHACHE, AND TYLENOL FOR GENERALIZED PAIN PER 
PATIENT REQUEST, CONTINUED MONITORING.

## 2020-01-31 VITALS — SYSTOLIC BLOOD PRESSURE: 139 MMHG | DIASTOLIC BLOOD PRESSURE: 73 MMHG

## 2020-01-31 VITALS — DIASTOLIC BLOOD PRESSURE: 89 MMHG | SYSTOLIC BLOOD PRESSURE: 164 MMHG

## 2020-01-31 VITALS — SYSTOLIC BLOOD PRESSURE: 151 MMHG | DIASTOLIC BLOOD PRESSURE: 66 MMHG

## 2020-01-31 RX ADMIN — INSULIN HUMAN PRN UNIT: 100 INJECTION, SOLUTION PARENTERAL at 12:12

## 2020-01-31 RX ADMIN — BENZTROPINE MESYLATE SCH MG: 1 TABLET ORAL at 12:06

## 2020-01-31 RX ADMIN — FERROUS SULFATE TAB 325 MG (65 MG ELEMENTAL FE) SCH MG: 325 (65 FE) TAB at 08:29

## 2020-01-31 RX ADMIN — METFORMIN HYDROCHLORIDE SCH MG: 500 TABLET, FILM COATED ORAL at 08:29

## 2020-01-31 RX ADMIN — INSULIN HUMAN PRN UNIT: 100 INJECTION, SOLUTION PARENTERAL at 17:19

## 2020-01-31 RX ADMIN — LORAZEPAM PRN MG: 1 TABLET ORAL at 17:01

## 2020-01-31 RX ADMIN — MUPIROCIN SCH APPLIC: 20 OINTMENT TOPICAL at 08:28

## 2020-01-31 RX ADMIN — INSULIN GLARGINE SCH UNIT: 100 INJECTION, SOLUTION SUBCUTANEOUS at 21:36

## 2020-01-31 RX ADMIN — Medication SCH EACH: at 07:32

## 2020-01-31 RX ADMIN — Medication SCH EACH: at 12:05

## 2020-01-31 RX ADMIN — HALOPERIDOL SCH MG: 1 TABLET ORAL at 12:06

## 2020-01-31 RX ADMIN — OLANZAPINE SCH MG: 5 TABLET, FILM COATED ORAL at 12:06

## 2020-01-31 RX ADMIN — METFORMIN HYDROCHLORIDE SCH MG: 500 TABLET, FILM COATED ORAL at 18:04

## 2020-01-31 RX ADMIN — NEOMYCIN AND POLYMYXIN B SULFATES AND BACITRACIN ZINC SCH GM: 400; 3.5; 5 OINTMENT TOPICAL at 08:41

## 2020-01-31 RX ADMIN — DIVALPROEX SODIUM SCH MG: 250 TABLET, DELAYED RELEASE ORAL at 12:06

## 2020-01-31 RX ADMIN — GLIPIZIDE SCH MG: 2.5 TABLET, EXTENDED RELEASE ORAL at 17:02

## 2020-01-31 RX ADMIN — HALOPERIDOL SCH MG: 1 TABLET ORAL at 17:02

## 2020-01-31 RX ADMIN — LORAZEPAM PRN MG: 1 TABLET ORAL at 12:06

## 2020-01-31 RX ADMIN — MUPIROCIN SCH APPLIC: 20 OINTMENT TOPICAL at 21:26

## 2020-01-31 RX ADMIN — BENZTROPINE MESYLATE SCH MG: 1 TABLET ORAL at 08:29

## 2020-01-31 RX ADMIN — OLANZAPINE SCH MG: 10 TABLET ORAL at 21:26

## 2020-01-31 RX ADMIN — HALOPERIDOL SCH MG: 1 TABLET ORAL at 08:30

## 2020-01-31 RX ADMIN — ALUMINUM HYDROXIDE, MAGNESIUM HYDROXIDE, AND SIMETHICONE PRN ML: 200; 200; 20 SUSPENSION ORAL at 17:13

## 2020-01-31 RX ADMIN — LITHIUM CARBONATE SCH MG: 300 TABLET, FILM COATED, EXTENDED RELEASE ORAL at 07:47

## 2020-01-31 RX ADMIN — Medication SCH EACH: at 21:36

## 2020-01-31 RX ADMIN — DIVALPROEX SODIUM SCH MG: 250 TABLET, DELAYED RELEASE ORAL at 07:47

## 2020-01-31 RX ADMIN — OLANZAPINE SCH MG: 5 TABLET, FILM COATED ORAL at 07:47

## 2020-01-31 RX ADMIN — POTASSIUM CHLORIDE SCH MEQ: 1500 TABLET, EXTENDED RELEASE ORAL at 08:30

## 2020-01-31 RX ADMIN — Medication SCH EACH: at 17:14

## 2020-01-31 RX ADMIN — BENZTROPINE MESYLATE SCH MG: 1 TABLET ORAL at 17:01

## 2020-01-31 RX ADMIN — LINAGLIPTIN SCH MG: 5 TABLET, FILM COATED ORAL at 08:31

## 2020-01-31 RX ADMIN — DIVALPROEX SODIUM SCH MG: 500 TABLET, DELAYED RELEASE ORAL at 21:26

## 2020-01-31 RX ADMIN — INSULIN HUMAN PRN UNIT: 100 INJECTION, SOLUTION PARENTERAL at 07:28

## 2020-01-31 RX ADMIN — LEVOTHYROXINE SODIUM SCH MCG: 88 TABLET ORAL at 07:46

## 2020-01-31 RX ADMIN — LOSARTAN POTASSIUM SCH MG: 50 TABLET, FILM COATED ORAL at 08:28

## 2020-01-31 RX ADMIN — ACETAMINOPHEN PRN MG: 325 TABLET ORAL at 10:45

## 2020-01-31 RX ADMIN — GLIPIZIDE SCH MG: 2.5 TABLET, EXTENDED RELEASE ORAL at 07:46

## 2020-01-31 RX ADMIN — LITHIUM CARBONATE SCH MG: 300 TABLET, FILM COATED, EXTENDED RELEASE ORAL at 12:06

## 2020-01-31 RX ADMIN — LITHIUM CARBONATE SCH MG: 150 CAPSULE, GELATIN COATED ORAL at 17:01

## 2020-01-31 RX ADMIN — PANTOPRAZOLE SODIUM SCH MG: 40 TABLET, DELAYED RELEASE ORAL at 07:46

## 2020-01-31 NOTE — NUR
GPS RN NOTES



RECEIVED PT IN BED AWAKE, NO S/S OR COMPLAINTS OF PAIN AT THIS TIME.  PT IS DISPLAYING NO 
S/S OF APPARENT DISTRESS AT THIS TIME.  PT BREATHING IS UNLABORED WITH EQUAL RISE AND FALL 
OF THE CHEST.  PT A/O X2-3 ON ROOM AIR SATING 97%.  PT COMPLIANT WITH MEDICATION, 
DISORGANIZED, RESPONDING TO INTERNAL STIMULI, ANXIOUS, AND COOPERATIVE.  PT DENIES SUICIDE 
IDEATIONS AND HOMICIDAL IDEATIONS AT THIS TIME.  PT ASSISTED WITH TURNING AND REPOSITIONING 
Q2 HR AND PRN FOR COMFORT AND CIRCULATION.  PT HAS NO NEEDS AT THIS TIME.  PT EDUCATED ON 
THE USE OF THE CALL BELL.  PT BED SIDE RAILS UP X2 FOR SAFETY, BED IS LOCKED AND LOW.  WILL 
CONTINUE TO MONITOR Q15 MIN WITH THE HELP OF STAFF TO MAINTAIN SAFETY.

## 2020-01-31 NOTE — NUR
GROUP NOTE: Pt unable to participate in group due to being developmentally delayed and 
unable to participate in a group setting due to pts childlike behaviors and inability to 
comprehend and process group topic. Pt continues to cry uncontrollably without being 
triggered and she is unable to calm herself down. SW attempted to demonstrate acceptance 
through listening in a calm nurturing manner. However, pt does not have boundaries and 
demonstrates no sense of personal space awareness and does not respond to verbal que's or 
redirection.

## 2020-02-01 VITALS — DIASTOLIC BLOOD PRESSURE: 76 MMHG | SYSTOLIC BLOOD PRESSURE: 149 MMHG

## 2020-02-01 VITALS — DIASTOLIC BLOOD PRESSURE: 66 MMHG | SYSTOLIC BLOOD PRESSURE: 136 MMHG

## 2020-02-01 VITALS — SYSTOLIC BLOOD PRESSURE: 150 MMHG | DIASTOLIC BLOOD PRESSURE: 85 MMHG

## 2020-02-01 RX ADMIN — METFORMIN HYDROCHLORIDE SCH MG: 500 TABLET, FILM COATED ORAL at 08:30

## 2020-02-01 RX ADMIN — LITHIUM CARBONATE SCH MG: 300 TABLET, FILM COATED, EXTENDED RELEASE ORAL at 12:55

## 2020-02-01 RX ADMIN — OLANZAPINE SCH MG: 10 TABLET ORAL at 21:19

## 2020-02-01 RX ADMIN — MUPIROCIN SCH APPLIC: 20 OINTMENT TOPICAL at 21:19

## 2020-02-01 RX ADMIN — LINAGLIPTIN SCH MG: 5 TABLET, FILM COATED ORAL at 10:46

## 2020-02-01 RX ADMIN — Medication SCH EACH: at 08:10

## 2020-02-01 RX ADMIN — BENZTROPINE MESYLATE SCH MG: 1 TABLET ORAL at 16:51

## 2020-02-01 RX ADMIN — LOSARTAN POTASSIUM SCH MG: 50 TABLET, FILM COATED ORAL at 08:34

## 2020-02-01 RX ADMIN — LITHIUM CARBONATE SCH MG: 300 TABLET, FILM COATED, EXTENDED RELEASE ORAL at 08:28

## 2020-02-01 RX ADMIN — GLIPIZIDE SCH MG: 2.5 TABLET, EXTENDED RELEASE ORAL at 08:29

## 2020-02-01 RX ADMIN — BENZTROPINE MESYLATE SCH MG: 1 TABLET ORAL at 08:29

## 2020-02-01 RX ADMIN — HALOPERIDOL SCH MG: 1 TABLET ORAL at 12:55

## 2020-02-01 RX ADMIN — METFORMIN HYDROCHLORIDE SCH MG: 500 TABLET, FILM COATED ORAL at 17:30

## 2020-02-01 RX ADMIN — LITHIUM CARBONATE SCH MG: 150 CAPSULE, GELATIN COATED ORAL at 16:51

## 2020-02-01 RX ADMIN — DIVALPROEX SODIUM SCH MG: 250 TABLET, DELAYED RELEASE ORAL at 12:55

## 2020-02-01 RX ADMIN — Medication SCH EACH: at 17:30

## 2020-02-01 RX ADMIN — DIVALPROEX SODIUM SCH MG: 500 TABLET, DELAYED RELEASE ORAL at 21:19

## 2020-02-01 RX ADMIN — Medication SCH EACH: at 12:00

## 2020-02-01 RX ADMIN — INSULIN HUMAN PRN UNIT: 100 INJECTION, SOLUTION PARENTERAL at 21:36

## 2020-02-01 RX ADMIN — OLANZAPINE SCH MG: 5 TABLET, FILM COATED ORAL at 08:29

## 2020-02-01 RX ADMIN — Medication SCH EACH: at 21:27

## 2020-02-01 RX ADMIN — HALOPERIDOL SCH MG: 1 TABLET ORAL at 16:51

## 2020-02-01 RX ADMIN — ACETAMINOPHEN PRN MG: 325 TABLET ORAL at 17:30

## 2020-02-01 RX ADMIN — MUPIROCIN SCH APPLIC: 20 OINTMENT TOPICAL at 08:24

## 2020-02-01 RX ADMIN — INSULIN GLARGINE SCH UNIT: 100 INJECTION, SOLUTION SUBCUTANEOUS at 21:36

## 2020-02-01 RX ADMIN — LEVOTHYROXINE SODIUM SCH MCG: 88 TABLET ORAL at 08:28

## 2020-02-01 RX ADMIN — GLIPIZIDE SCH MG: 2.5 TABLET, EXTENDED RELEASE ORAL at 16:50

## 2020-02-01 RX ADMIN — PANTOPRAZOLE SODIUM SCH MG: 40 TABLET, DELAYED RELEASE ORAL at 08:30

## 2020-02-01 RX ADMIN — HALOPERIDOL SCH MG: 1 TABLET ORAL at 08:30

## 2020-02-01 RX ADMIN — BENZTROPINE MESYLATE SCH MG: 1 TABLET ORAL at 12:55

## 2020-02-01 RX ADMIN — OLANZAPINE SCH MG: 5 TABLET, FILM COATED ORAL at 12:56

## 2020-02-01 RX ADMIN — DIVALPROEX SODIUM SCH MG: 250 TABLET, DELAYED RELEASE ORAL at 08:29

## 2020-02-01 RX ADMIN — POTASSIUM CHLORIDE SCH MEQ: 1500 TABLET, EXTENDED RELEASE ORAL at 08:28

## 2020-02-01 RX ADMIN — FERROUS SULFATE TAB 325 MG (65 MG ELEMENTAL FE) SCH MG: 325 (65 FE) TAB at 08:28

## 2020-02-01 RX ADMIN — INSULIN HUMAN PRN UNIT: 100 INJECTION, SOLUTION PARENTERAL at 08:12

## 2020-02-01 RX ADMIN — ACETAMINOPHEN PRN MG: 325 TABLET ORAL at 11:11

## 2020-02-01 RX ADMIN — LORAZEPAM PRN MG: 1 TABLET ORAL at 10:46

## 2020-02-01 RX ADMIN — NEOMYCIN AND POLYMYXIN B SULFATES AND BACITRACIN ZINC SCH APPLIC: 400; 3.5; 5 OINTMENT TOPICAL at 10:47

## 2020-02-02 VITALS — DIASTOLIC BLOOD PRESSURE: 65 MMHG | SYSTOLIC BLOOD PRESSURE: 138 MMHG

## 2020-02-02 VITALS — DIASTOLIC BLOOD PRESSURE: 83 MMHG | SYSTOLIC BLOOD PRESSURE: 154 MMHG

## 2020-02-02 VITALS — SYSTOLIC BLOOD PRESSURE: 157 MMHG | DIASTOLIC BLOOD PRESSURE: 90 MMHG

## 2020-02-02 RX ADMIN — Medication SCH EACH: at 12:00

## 2020-02-02 RX ADMIN — ALUMINUM HYDROXIDE, MAGNESIUM HYDROXIDE, AND SIMETHICONE PRN ML: 200; 200; 20 SUSPENSION ORAL at 17:09

## 2020-02-02 RX ADMIN — INSULIN HUMAN PRN UNIT: 100 INJECTION, SOLUTION PARENTERAL at 08:45

## 2020-02-02 RX ADMIN — BENZTROPINE MESYLATE SCH MG: 1 TABLET ORAL at 08:40

## 2020-02-02 RX ADMIN — LOSARTAN POTASSIUM SCH MG: 50 TABLET, FILM COATED ORAL at 08:42

## 2020-02-02 RX ADMIN — ALUMINUM HYDROXIDE, MAGNESIUM HYDROXIDE, AND SIMETHICONE PRN ML: 200; 200; 20 SUSPENSION ORAL at 10:40

## 2020-02-02 RX ADMIN — LITHIUM CARBONATE SCH MG: 300 TABLET, FILM COATED, EXTENDED RELEASE ORAL at 12:35

## 2020-02-02 RX ADMIN — DIVALPROEX SODIUM SCH MG: 250 TABLET, DELAYED RELEASE ORAL at 12:35

## 2020-02-02 RX ADMIN — TEMAZEPAM PRN MG: 7.5 CAPSULE ORAL at 22:55

## 2020-02-02 RX ADMIN — HALOPERIDOL SCH MG: 1 TABLET ORAL at 08:41

## 2020-02-02 RX ADMIN — DIVALPROEX SODIUM SCH MG: 500 TABLET, DELAYED RELEASE ORAL at 22:12

## 2020-02-02 RX ADMIN — GLIPIZIDE SCH MG: 2.5 TABLET, EXTENDED RELEASE ORAL at 08:41

## 2020-02-02 RX ADMIN — LITHIUM CARBONATE SCH MG: 150 CAPSULE, GELATIN COATED ORAL at 17:58

## 2020-02-02 RX ADMIN — HALOPERIDOL SCH MG: 1 TABLET ORAL at 12:35

## 2020-02-02 RX ADMIN — LINAGLIPTIN SCH MG: 5 TABLET, FILM COATED ORAL at 08:41

## 2020-02-02 RX ADMIN — BENZTROPINE MESYLATE SCH MG: 1 TABLET ORAL at 12:36

## 2020-02-02 RX ADMIN — Medication SCH EACH: at 08:42

## 2020-02-02 RX ADMIN — ACETAMINOPHEN PRN MG: 325 TABLET ORAL at 08:41

## 2020-02-02 RX ADMIN — LITHIUM CARBONATE SCH MG: 300 TABLET, FILM COATED, EXTENDED RELEASE ORAL at 08:40

## 2020-02-02 RX ADMIN — METFORMIN HYDROCHLORIDE SCH MG: 500 TABLET, FILM COATED ORAL at 17:58

## 2020-02-02 RX ADMIN — LORAZEPAM PRN MG: 1 TABLET ORAL at 22:25

## 2020-02-02 RX ADMIN — DIVALPROEX SODIUM SCH MG: 250 TABLET, DELAYED RELEASE ORAL at 08:41

## 2020-02-02 RX ADMIN — PANTOPRAZOLE SODIUM SCH MG: 40 TABLET, DELAYED RELEASE ORAL at 08:40

## 2020-02-02 RX ADMIN — OLANZAPINE SCH MG: 10 TABLET ORAL at 21:49

## 2020-02-02 RX ADMIN — HALOPERIDOL SCH MG: 1 TABLET ORAL at 17:58

## 2020-02-02 RX ADMIN — NEOMYCIN AND POLYMYXIN B SULFATES AND BACITRACIN ZINC SCH APPLIC: 400; 3.5; 5 OINTMENT TOPICAL at 08:42

## 2020-02-02 RX ADMIN — OLANZAPINE SCH MG: 5 TABLET, FILM COATED ORAL at 12:35

## 2020-02-02 RX ADMIN — LORAZEPAM PRN MG: 1 TABLET ORAL at 10:40

## 2020-02-02 RX ADMIN — Medication SCH EACH: at 17:30

## 2020-02-02 RX ADMIN — INSULIN GLARGINE SCH UNIT: 100 INJECTION, SOLUTION SUBCUTANEOUS at 22:00

## 2020-02-02 RX ADMIN — POTASSIUM CHLORIDE SCH MEQ: 1500 TABLET, EXTENDED RELEASE ORAL at 08:40

## 2020-02-02 RX ADMIN — GLIPIZIDE SCH MG: 2.5 TABLET, EXTENDED RELEASE ORAL at 17:58

## 2020-02-02 RX ADMIN — FERROUS SULFATE TAB 325 MG (65 MG ELEMENTAL FE) SCH MG: 325 (65 FE) TAB at 08:41

## 2020-02-02 RX ADMIN — MUPIROCIN SCH APPLIC: 20 OINTMENT TOPICAL at 08:43

## 2020-02-02 RX ADMIN — OLANZAPINE SCH MG: 5 TABLET, FILM COATED ORAL at 08:41

## 2020-02-02 RX ADMIN — MUPIROCIN SCH APPLIC: 20 OINTMENT TOPICAL at 21:48

## 2020-02-02 RX ADMIN — METFORMIN HYDROCHLORIDE SCH MG: 500 TABLET, FILM COATED ORAL at 08:40

## 2020-02-02 RX ADMIN — Medication SCH EACH: at 22:05

## 2020-02-02 RX ADMIN — BENZTROPINE MESYLATE SCH MG: 1 TABLET ORAL at 17:59

## 2020-02-02 RX ADMIN — LEVOTHYROXINE SODIUM SCH MCG: 88 TABLET ORAL at 08:41

## 2020-02-03 VITALS — SYSTOLIC BLOOD PRESSURE: 150 MMHG | DIASTOLIC BLOOD PRESSURE: 73 MMHG

## 2020-02-03 VITALS — DIASTOLIC BLOOD PRESSURE: 81 MMHG | SYSTOLIC BLOOD PRESSURE: 149 MMHG

## 2020-02-03 VITALS — SYSTOLIC BLOOD PRESSURE: 148 MMHG | DIASTOLIC BLOOD PRESSURE: 68 MMHG

## 2020-02-03 PROCEDURE — 0JBQ0ZZ EXCISION OF RIGHT FOOT SUBCUTANEOUS TISSUE AND FASCIA, OPEN APPROACH: ICD-10-PCS | Performed by: PODIATRIST

## 2020-02-03 RX ADMIN — INSULIN HUMAN PRN UNIT: 100 INJECTION, SOLUTION PARENTERAL at 22:32

## 2020-02-03 RX ADMIN — FERROUS SULFATE TAB 325 MG (65 MG ELEMENTAL FE) SCH MG: 325 (65 FE) TAB at 08:24

## 2020-02-03 RX ADMIN — LITHIUM CARBONATE SCH MG: 300 TABLET, FILM COATED, EXTENDED RELEASE ORAL at 12:25

## 2020-02-03 RX ADMIN — GLIPIZIDE SCH MG: 2.5 TABLET, EXTENDED RELEASE ORAL at 07:28

## 2020-02-03 RX ADMIN — DIVALPROEX SODIUM SCH MG: 250 TABLET, DELAYED RELEASE ORAL at 12:25

## 2020-02-03 RX ADMIN — LORAZEPAM PRN MG: 1 TABLET ORAL at 12:26

## 2020-02-03 RX ADMIN — INSULIN HUMAN PRN UNIT: 100 INJECTION, SOLUTION PARENTERAL at 17:26

## 2020-02-03 RX ADMIN — MUPIROCIN SCH APPLIC: 20 OINTMENT TOPICAL at 08:46

## 2020-02-03 RX ADMIN — ACETAMINOPHEN PRN MG: 325 TABLET ORAL at 14:30

## 2020-02-03 RX ADMIN — HALOPERIDOL SCH MG: 1 TABLET ORAL at 08:24

## 2020-02-03 RX ADMIN — LOSARTAN POTASSIUM SCH MG: 50 TABLET, FILM COATED ORAL at 08:29

## 2020-02-03 RX ADMIN — METFORMIN HYDROCHLORIDE SCH MG: 500 TABLET, FILM COATED ORAL at 17:44

## 2020-02-03 RX ADMIN — POTASSIUM CHLORIDE SCH MEQ: 1500 TABLET, EXTENDED RELEASE ORAL at 08:24

## 2020-02-03 RX ADMIN — DIVALPROEX SODIUM SCH MG: 250 TABLET, DELAYED RELEASE ORAL at 07:28

## 2020-02-03 RX ADMIN — BENZTROPINE MESYLATE SCH MG: 1 TABLET ORAL at 08:24

## 2020-02-03 RX ADMIN — INSULIN HUMAN PRN UNIT: 100 INJECTION, SOLUTION PARENTERAL at 07:21

## 2020-02-03 RX ADMIN — Medication SCH EACH: at 07:18

## 2020-02-03 RX ADMIN — LORAZEPAM PRN MG: 1 TABLET ORAL at 08:24

## 2020-02-03 RX ADMIN — OLANZAPINE SCH MG: 10 TABLET ORAL at 22:07

## 2020-02-03 RX ADMIN — HALOPERIDOL SCH MG: 1 TABLET ORAL at 12:26

## 2020-02-03 RX ADMIN — Medication SCH EACH: at 17:24

## 2020-02-03 RX ADMIN — GLIPIZIDE SCH MG: 2.5 TABLET, EXTENDED RELEASE ORAL at 16:21

## 2020-02-03 RX ADMIN — METFORMIN HYDROCHLORIDE SCH MG: 500 TABLET, FILM COATED ORAL at 08:24

## 2020-02-03 RX ADMIN — LEVOTHYROXINE SODIUM SCH MCG: 88 TABLET ORAL at 07:28

## 2020-02-03 RX ADMIN — MUPIROCIN SCH APPLIC: 20 OINTMENT TOPICAL at 21:42

## 2020-02-03 RX ADMIN — BENZTROPINE MESYLATE SCH MG: 1 TABLET ORAL at 12:26

## 2020-02-03 RX ADMIN — ACETAMINOPHEN PRN MG: 325 TABLET ORAL at 08:24

## 2020-02-03 RX ADMIN — DIVALPROEX SODIUM SCH MG: 500 TABLET, DELAYED RELEASE ORAL at 22:06

## 2020-02-03 RX ADMIN — ALUMINUM HYDROXIDE, MAGNESIUM HYDROXIDE, AND SIMETHICONE PRN ML: 200; 200; 20 SUSPENSION ORAL at 11:36

## 2020-02-03 RX ADMIN — PANTOPRAZOLE SODIUM SCH MG: 40 TABLET, DELAYED RELEASE ORAL at 07:28

## 2020-02-03 RX ADMIN — OLANZAPINE SCH MG: 5 TABLET, FILM COATED ORAL at 12:25

## 2020-02-03 RX ADMIN — Medication SCH EACH: at 21:59

## 2020-02-03 RX ADMIN — OLANZAPINE SCH MG: 5 TABLET, FILM COATED ORAL at 07:28

## 2020-02-03 RX ADMIN — Medication SCH EACH: at 11:43

## 2020-02-03 RX ADMIN — HALOPERIDOL SCH MG: 1 TABLET ORAL at 16:21

## 2020-02-03 RX ADMIN — NEOMYCIN AND POLYMYXIN B SULFATES AND BACITRACIN ZINC SCH APPLIC: 400; 3.5; 5 OINTMENT TOPICAL at 08:45

## 2020-02-03 RX ADMIN — BENZTROPINE MESYLATE SCH MG: 1 TABLET ORAL at 16:20

## 2020-02-03 RX ADMIN — LITHIUM CARBONATE SCH MG: 150 CAPSULE, GELATIN COATED ORAL at 16:20

## 2020-02-03 RX ADMIN — LINAGLIPTIN SCH MG: 5 TABLET, FILM COATED ORAL at 08:24

## 2020-02-03 RX ADMIN — INSULIN GLARGINE SCH UNIT: 100 INJECTION, SOLUTION SUBCUTANEOUS at 22:30

## 2020-02-03 RX ADMIN — LITHIUM CARBONATE SCH MG: 300 TABLET, FILM COATED, EXTENDED RELEASE ORAL at 07:28

## 2020-02-03 NOTE — NUR
RN NOTE: DR. BOSE AT BEDSIDE. DEBRIDEMENT OF RIGHT FOOT. DRESSING APPLIED AND CULTURE 
SENT TO LAB. PT MACIE PROCEDURE WELL.

-------------------------------------------------------------------------------

Addendum: 02/03/20 at 1546 by MELODIE POON RN

-------------------------------------------------------------------------------

ADDENDUM: DR. BOSE AT BEDSIDE AT 12:00

## 2020-02-04 VITALS — SYSTOLIC BLOOD PRESSURE: 129 MMHG | DIASTOLIC BLOOD PRESSURE: 83 MMHG

## 2020-02-04 VITALS — SYSTOLIC BLOOD PRESSURE: 138 MMHG | DIASTOLIC BLOOD PRESSURE: 85 MMHG

## 2020-02-04 VITALS — SYSTOLIC BLOOD PRESSURE: 153 MMHG | DIASTOLIC BLOOD PRESSURE: 97 MMHG

## 2020-02-04 RX ADMIN — DIVALPROEX SODIUM SCH MG: 500 TABLET, DELAYED RELEASE ORAL at 21:45

## 2020-02-04 RX ADMIN — BENZTROPINE MESYLATE SCH MG: 1 TABLET ORAL at 16:20

## 2020-02-04 RX ADMIN — METFORMIN HYDROCHLORIDE SCH MG: 500 TABLET, FILM COATED ORAL at 08:00

## 2020-02-04 RX ADMIN — Medication SCH EACH: at 11:31

## 2020-02-04 RX ADMIN — FERROUS SULFATE TAB 325 MG (65 MG ELEMENTAL FE) SCH MG: 325 (65 FE) TAB at 08:00

## 2020-02-04 RX ADMIN — Medication SCH EACH: at 17:21

## 2020-02-04 RX ADMIN — MUPIROCIN SCH GM: 20 OINTMENT TOPICAL at 19:01

## 2020-02-04 RX ADMIN — HALOPERIDOL SCH MG: 5 TABLET ORAL at 16:20

## 2020-02-04 RX ADMIN — LOSARTAN POTASSIUM SCH MG: 50 TABLET, FILM COATED ORAL at 08:00

## 2020-02-04 RX ADMIN — Medication SCH EACH: at 07:54

## 2020-02-04 RX ADMIN — ACETAMINOPHEN PRN MG: 325 TABLET ORAL at 09:55

## 2020-02-04 RX ADMIN — GLIPIZIDE SCH MG: 2.5 TABLET, EXTENDED RELEASE ORAL at 16:20

## 2020-02-04 RX ADMIN — LITHIUM CARBONATE SCH MG: 300 TABLET, FILM COATED, EXTENDED RELEASE ORAL at 07:56

## 2020-02-04 RX ADMIN — HALOPERIDOL SCH MG: 5 TABLET ORAL at 20:06

## 2020-02-04 RX ADMIN — LORAZEPAM PRN MG: 1 TABLET ORAL at 16:20

## 2020-02-04 RX ADMIN — Medication SCH EACH: at 21:51

## 2020-02-04 RX ADMIN — NEOMYCIN AND POLYMYXIN B SULFATES AND BACITRACIN ZINC SCH APPLIC: 400; 3.5; 5 OINTMENT TOPICAL at 08:01

## 2020-02-04 RX ADMIN — ACETAMINOPHEN PRN MG: 325 TABLET ORAL at 16:20

## 2020-02-04 RX ADMIN — BENZTROPINE MESYLATE SCH MG: 1 TABLET ORAL at 12:51

## 2020-02-04 RX ADMIN — BENZTROPINE MESYLATE SCH MG: 1 TABLET ORAL at 08:00

## 2020-02-04 RX ADMIN — HALOPERIDOL SCH MG: 5 TABLET ORAL at 12:51

## 2020-02-04 RX ADMIN — DIVALPROEX SODIUM SCH MG: 250 TABLET, DELAYED RELEASE ORAL at 12:51

## 2020-02-04 RX ADMIN — OLANZAPINE SCH MG: 5 TABLET, FILM COATED ORAL at 07:56

## 2020-02-04 RX ADMIN — OLANZAPINE SCH MG: 10 TABLET ORAL at 21:45

## 2020-02-04 RX ADMIN — MUPIROCIN SCH APPLIC: 20 OINTMENT TOPICAL at 08:01

## 2020-02-04 RX ADMIN — HALOPERIDOL SCH MG: 1 TABLET ORAL at 08:00

## 2020-02-04 RX ADMIN — DIVALPROEX SODIUM SCH MG: 250 TABLET, DELAYED RELEASE ORAL at 07:56

## 2020-02-04 RX ADMIN — POTASSIUM CHLORIDE SCH MEQ: 1500 TABLET, EXTENDED RELEASE ORAL at 08:00

## 2020-02-04 RX ADMIN — LITHIUM CARBONATE SCH MG: 300 TABLET, FILM COATED, EXTENDED RELEASE ORAL at 12:51

## 2020-02-04 RX ADMIN — PANTOPRAZOLE SODIUM SCH MG: 40 TABLET, DELAYED RELEASE ORAL at 07:55

## 2020-02-04 RX ADMIN — TEMAZEPAM PRN MG: 7.5 CAPSULE ORAL at 23:12

## 2020-02-04 RX ADMIN — GLIPIZIDE SCH MG: 2.5 TABLET, EXTENDED RELEASE ORAL at 07:55

## 2020-02-04 RX ADMIN — METFORMIN HYDROCHLORIDE SCH MG: 500 TABLET, FILM COATED ORAL at 18:08

## 2020-02-04 RX ADMIN — LITHIUM CARBONATE SCH MG: 150 CAPSULE, GELATIN COATED ORAL at 16:21

## 2020-02-04 RX ADMIN — LEVOTHYROXINE SODIUM SCH MCG: 88 TABLET ORAL at 07:55

## 2020-02-04 RX ADMIN — INSULIN GLARGINE SCH UNIT: 100 INJECTION, SOLUTION SUBCUTANEOUS at 21:53

## 2020-02-04 RX ADMIN — LORAZEPAM PRN MG: 1 TABLET ORAL at 10:59

## 2020-02-04 RX ADMIN — LINAGLIPTIN SCH MG: 5 TABLET, FILM COATED ORAL at 08:00

## 2020-02-04 NOTE — NUR
FAMILY CONTACT: RADHA contacted pts sister Bridgette [phone] and provided her with an update 
via voicemail.

## 2020-02-05 VITALS — SYSTOLIC BLOOD PRESSURE: 163 MMHG | DIASTOLIC BLOOD PRESSURE: 71 MMHG

## 2020-02-05 VITALS — SYSTOLIC BLOOD PRESSURE: 156 MMHG | DIASTOLIC BLOOD PRESSURE: 62 MMHG

## 2020-02-05 VITALS — SYSTOLIC BLOOD PRESSURE: 136 MMHG | DIASTOLIC BLOOD PRESSURE: 72 MMHG

## 2020-02-05 VITALS — DIASTOLIC BLOOD PRESSURE: 91 MMHG | SYSTOLIC BLOOD PRESSURE: 163 MMHG

## 2020-02-05 VITALS — SYSTOLIC BLOOD PRESSURE: 101 MMHG | DIASTOLIC BLOOD PRESSURE: 58 MMHG

## 2020-02-05 RX ADMIN — LINAGLIPTIN SCH MG: 5 TABLET, FILM COATED ORAL at 08:12

## 2020-02-05 RX ADMIN — LITHIUM CARBONATE SCH MG: 150 CAPSULE, GELATIN COATED ORAL at 16:31

## 2020-02-05 RX ADMIN — ACETAMINOPHEN PRN MG: 325 TABLET ORAL at 13:04

## 2020-02-05 RX ADMIN — HALOPERIDOL SCH MG: 5 TABLET ORAL at 20:48

## 2020-02-05 RX ADMIN — IBUPROFEN PRN MG: 400 TABLET, FILM COATED ORAL at 17:54

## 2020-02-05 RX ADMIN — METFORMIN HYDROCHLORIDE SCH MG: 500 TABLET, FILM COATED ORAL at 08:13

## 2020-02-05 RX ADMIN — DIVALPROEX SODIUM SCH MG: 250 TABLET, DELAYED RELEASE ORAL at 08:12

## 2020-02-05 RX ADMIN — LITHIUM CARBONATE SCH MG: 300 TABLET, FILM COATED, EXTENDED RELEASE ORAL at 08:12

## 2020-02-05 RX ADMIN — LEVOTHYROXINE SODIUM SCH MCG: 88 TABLET ORAL at 08:12

## 2020-02-05 RX ADMIN — Medication SCH EACH: at 07:51

## 2020-02-05 RX ADMIN — Medication SCH EACH: at 12:11

## 2020-02-05 RX ADMIN — PANTOPRAZOLE SODIUM SCH MG: 40 TABLET, DELAYED RELEASE ORAL at 08:12

## 2020-02-05 RX ADMIN — OLANZAPINE SCH MG: 10 TABLET ORAL at 22:07

## 2020-02-05 RX ADMIN — INSULIN HUMAN PRN UNIT: 100 INJECTION, SOLUTION PARENTERAL at 12:44

## 2020-02-05 RX ADMIN — MUPIROCIN SCH GM: 20 OINTMENT TOPICAL at 05:34

## 2020-02-05 RX ADMIN — Medication SCH EACH: at 17:23

## 2020-02-05 RX ADMIN — BENZTROPINE MESYLATE SCH MG: 1 TABLET ORAL at 12:11

## 2020-02-05 RX ADMIN — MUPIROCIN SCH GM: 20 OINTMENT TOPICAL at 17:39

## 2020-02-05 RX ADMIN — BENZTROPINE MESYLATE SCH MG: 1 TABLET ORAL at 16:31

## 2020-02-05 RX ADMIN — NEOMYCIN AND POLYMYXIN B SULFATES AND BACITRACIN ZINC SCH APPLIC: 400; 3.5; 5 OINTMENT TOPICAL at 08:14

## 2020-02-05 RX ADMIN — INSULIN HUMAN PRN UNIT: 100 INJECTION, SOLUTION PARENTERAL at 17:51

## 2020-02-05 RX ADMIN — LOSARTAN POTASSIUM SCH MG: 50 TABLET, FILM COATED ORAL at 08:13

## 2020-02-05 RX ADMIN — GLIPIZIDE SCH MG: 2.5 TABLET, EXTENDED RELEASE ORAL at 08:13

## 2020-02-05 RX ADMIN — INSULIN HUMAN PRN UNIT: 100 INJECTION, SOLUTION PARENTERAL at 22:22

## 2020-02-05 RX ADMIN — Medication SCH EACH: at 22:14

## 2020-02-05 RX ADMIN — HALOPERIDOL SCH MG: 5 TABLET ORAL at 16:31

## 2020-02-05 RX ADMIN — LORAZEPAM PRN MG: 1 TABLET ORAL at 14:47

## 2020-02-05 RX ADMIN — DIVALPROEX SODIUM SCH MG: 500 TABLET, DELAYED RELEASE ORAL at 22:08

## 2020-02-05 RX ADMIN — BENZTROPINE MESYLATE SCH MG: 1 TABLET ORAL at 08:13

## 2020-02-05 RX ADMIN — METFORMIN HYDROCHLORIDE SCH MG: 500 TABLET, FILM COATED ORAL at 17:37

## 2020-02-05 RX ADMIN — ACETAMINOPHEN PRN MG: 325 TABLET ORAL at 20:26

## 2020-02-05 RX ADMIN — HALOPERIDOL SCH MG: 5 TABLET ORAL at 08:12

## 2020-02-05 RX ADMIN — INSULIN GLARGINE SCH UNIT: 100 INJECTION, SOLUTION SUBCUTANEOUS at 22:21

## 2020-02-05 RX ADMIN — LITHIUM CARBONATE SCH MG: 300 TABLET, FILM COATED, EXTENDED RELEASE ORAL at 12:12

## 2020-02-05 RX ADMIN — POTASSIUM CHLORIDE SCH MEQ: 1500 TABLET, EXTENDED RELEASE ORAL at 08:12

## 2020-02-05 RX ADMIN — GLIPIZIDE SCH MG: 2.5 TABLET, EXTENDED RELEASE ORAL at 16:31

## 2020-02-05 RX ADMIN — DIVALPROEX SODIUM SCH MG: 250 TABLET, DELAYED RELEASE ORAL at 12:11

## 2020-02-05 RX ADMIN — HALOPERIDOL SCH MG: 5 TABLET ORAL at 12:12

## 2020-02-05 RX ADMIN — FERROUS SULFATE TAB 325 MG (65 MG ELEMENTAL FE) SCH MG: 325 (65 FE) TAB at 08:13

## 2020-02-06 VITALS — SYSTOLIC BLOOD PRESSURE: 126 MMHG | DIASTOLIC BLOOD PRESSURE: 54 MMHG

## 2020-02-06 VITALS — DIASTOLIC BLOOD PRESSURE: 96 MMHG | SYSTOLIC BLOOD PRESSURE: 158 MMHG

## 2020-02-06 VITALS — SYSTOLIC BLOOD PRESSURE: 160 MMHG | DIASTOLIC BLOOD PRESSURE: 86 MMHG

## 2020-02-06 RX ADMIN — INSULIN HUMAN PRN UNIT: 100 INJECTION, SOLUTION PARENTERAL at 08:45

## 2020-02-06 RX ADMIN — FERROUS SULFATE TAB 325 MG (65 MG ELEMENTAL FE) SCH MG: 325 (65 FE) TAB at 08:07

## 2020-02-06 RX ADMIN — INSULIN HUMAN PRN UNIT: 100 INJECTION, SOLUTION PARENTERAL at 21:42

## 2020-02-06 RX ADMIN — HALOPERIDOL SCH MG: 5 TABLET ORAL at 21:41

## 2020-02-06 RX ADMIN — Medication SCH EACH: at 21:45

## 2020-02-06 RX ADMIN — ACETAMINOPHEN PRN MG: 325 TABLET ORAL at 09:38

## 2020-02-06 RX ADMIN — LEVOTHYROXINE SODIUM SCH MCG: 88 TABLET ORAL at 08:06

## 2020-02-06 RX ADMIN — GLIPIZIDE SCH MG: 2.5 TABLET, EXTENDED RELEASE ORAL at 16:11

## 2020-02-06 RX ADMIN — HALOPERIDOL SCH MG: 5 TABLET ORAL at 16:12

## 2020-02-06 RX ADMIN — LINAGLIPTIN SCH MG: 5 TABLET, FILM COATED ORAL at 08:06

## 2020-02-06 RX ADMIN — PANTOPRAZOLE SODIUM SCH MG: 40 TABLET, DELAYED RELEASE ORAL at 08:06

## 2020-02-06 RX ADMIN — DIVALPROEX SODIUM SCH MG: 250 TABLET, DELAYED RELEASE ORAL at 08:06

## 2020-02-06 RX ADMIN — POTASSIUM CHLORIDE SCH MEQ: 1500 TABLET, EXTENDED RELEASE ORAL at 08:06

## 2020-02-06 RX ADMIN — GLIPIZIDE SCH MG: 2.5 TABLET, EXTENDED RELEASE ORAL at 08:06

## 2020-02-06 RX ADMIN — LORAZEPAM PRN MG: 1 TABLET ORAL at 08:29

## 2020-02-06 RX ADMIN — LITHIUM CARBONATE SCH MG: 300 TABLET, FILM COATED, EXTENDED RELEASE ORAL at 13:00

## 2020-02-06 RX ADMIN — DIVALPROEX SODIUM SCH MG: 500 TABLET, DELAYED RELEASE ORAL at 21:41

## 2020-02-06 RX ADMIN — Medication SCH EACH: at 11:41

## 2020-02-06 RX ADMIN — ACETAMINOPHEN PRN MG: 325 TABLET ORAL at 16:12

## 2020-02-06 RX ADMIN — BENZTROPINE MESYLATE SCH MG: 1 TABLET ORAL at 13:00

## 2020-02-06 RX ADMIN — MUPIROCIN SCH GM: 20 OINTMENT TOPICAL at 06:33

## 2020-02-06 RX ADMIN — LORAZEPAM PRN MG: 1 TABLET ORAL at 13:00

## 2020-02-06 RX ADMIN — Medication SCH EACH: at 17:36

## 2020-02-06 RX ADMIN — BENZTROPINE MESYLATE SCH MG: 1 TABLET ORAL at 08:07

## 2020-02-06 RX ADMIN — IBUPROFEN PRN MG: 400 TABLET, FILM COATED ORAL at 13:00

## 2020-02-06 RX ADMIN — Medication SCH EACH: at 08:05

## 2020-02-06 RX ADMIN — INSULIN GLARGINE SCH UNIT: 100 INJECTION, SOLUTION SUBCUTANEOUS at 21:41

## 2020-02-06 RX ADMIN — MUPIROCIN SCH GM: 20 OINTMENT TOPICAL at 18:38

## 2020-02-06 RX ADMIN — HALOPERIDOL SCH MG: 5 TABLET ORAL at 13:00

## 2020-02-06 RX ADMIN — LITHIUM CARBONATE SCH MG: 150 CAPSULE, GELATIN COATED ORAL at 16:12

## 2020-02-06 RX ADMIN — METFORMIN HYDROCHLORIDE SCH MG: 500 TABLET, FILM COATED ORAL at 18:22

## 2020-02-06 RX ADMIN — LOSARTAN POTASSIUM SCH MG: 50 TABLET, FILM COATED ORAL at 08:07

## 2020-02-06 RX ADMIN — DIVALPROEX SODIUM SCH MG: 250 TABLET, DELAYED RELEASE ORAL at 13:00

## 2020-02-06 RX ADMIN — OLANZAPINE SCH MG: 10 TABLET ORAL at 21:41

## 2020-02-06 RX ADMIN — BENZTROPINE MESYLATE SCH MG: 1 TABLET ORAL at 16:12

## 2020-02-06 RX ADMIN — HALOPERIDOL SCH MG: 5 TABLET ORAL at 08:06

## 2020-02-06 RX ADMIN — METFORMIN HYDROCHLORIDE SCH MG: 500 TABLET, FILM COATED ORAL at 08:06

## 2020-02-06 RX ADMIN — LITHIUM CARBONATE SCH MG: 300 TABLET, FILM COATED, EXTENDED RELEASE ORAL at 08:06

## 2020-02-06 RX ADMIN — NEOMYCIN AND POLYMYXIN B SULFATES AND BACITRACIN ZINC SCH APPLIC: 400; 3.5; 5 OINTMENT TOPICAL at 08:07

## 2020-02-06 RX ADMIN — ALUMINUM HYDROXIDE, MAGNESIUM HYDROXIDE, AND SIMETHICONE PRN ML: 200; 200; 20 SUSPENSION ORAL at 13:45

## 2020-02-06 NOTE — NUR
Group Note: SW encouraged pt to participate in group on 2/6/20 at 2pm discussing discharge 
planning. Pt does not appear to be appropriate for group as she appears to be hyperverbal, 
disorganized and highly emotional. Pt presented as crying and was repetitive with her 
questions about her discharge. SW stated that the pt will be discharged tomorrow and the pt 
appeared to be content but needed reassurance and had the SW repeat the information multiple 
times. Pt has difficulty understanding and is not appropriate for group.

## 2020-02-06 NOTE — NUR
FACILITY CONTACT: RADHA Skelton,  at [facility] Address: [address] 
[place] [address]  Phone: [phone] and informed her pt will be discharged 
tomorrow Friday 2/7/20. Nafisa stated that she is unable to provide transportation. 

-------------------------------------------------------------------------------

Addendum: 02/10/20 at 1038 by THEA GARCIA

-------------------------------------------------------------------------------

Nafisa agreed with discharge tomorrow Friday 2/7/20.

## 2020-02-06 NOTE — NUR
FAMILY CONTACT: RADHA contacted pts sister Bridgette [phone] and informed her pt will be 
discharged back to [facility] tomorrow Friday 2/7/20 at 11:00am. Sister agreed.

## 2020-02-07 VITALS — DIASTOLIC BLOOD PRESSURE: 62 MMHG | SYSTOLIC BLOOD PRESSURE: 128 MMHG

## 2020-02-07 RX ADMIN — BENZTROPINE MESYLATE SCH MG: 1 TABLET ORAL at 08:00

## 2020-02-07 RX ADMIN — LINAGLIPTIN SCH MG: 5 TABLET, FILM COATED ORAL at 08:01

## 2020-02-07 RX ADMIN — POTASSIUM CHLORIDE SCH MEQ: 1500 TABLET, EXTENDED RELEASE ORAL at 08:00

## 2020-02-07 RX ADMIN — HALOPERIDOL SCH MG: 5 TABLET ORAL at 08:01

## 2020-02-07 RX ADMIN — METFORMIN HYDROCHLORIDE SCH MG: 500 TABLET, FILM COATED ORAL at 08:01

## 2020-02-07 RX ADMIN — NEOMYCIN AND POLYMYXIN B SULFATES AND BACITRACIN ZINC SCH APPLIC: 400; 3.5; 5 OINTMENT TOPICAL at 08:03

## 2020-02-07 RX ADMIN — LORAZEPAM PRN MG: 1 TABLET ORAL at 08:01

## 2020-02-07 RX ADMIN — MUPIROCIN SCH GM: 20 OINTMENT TOPICAL at 06:30

## 2020-02-07 RX ADMIN — Medication SCH EACH: at 07:57

## 2020-02-07 RX ADMIN — LOSARTAN POTASSIUM SCH MG: 50 TABLET, FILM COATED ORAL at 08:02

## 2020-02-07 RX ADMIN — PANTOPRAZOLE SODIUM SCH MG: 40 TABLET, DELAYED RELEASE ORAL at 08:01

## 2020-02-07 RX ADMIN — LEVOTHYROXINE SODIUM SCH MCG: 88 TABLET ORAL at 08:01

## 2020-02-07 RX ADMIN — GLIPIZIDE SCH MG: 2.5 TABLET, EXTENDED RELEASE ORAL at 08:01

## 2020-02-07 RX ADMIN — DIVALPROEX SODIUM SCH MG: 250 TABLET, DELAYED RELEASE ORAL at 08:00

## 2020-02-07 RX ADMIN — LITHIUM CARBONATE SCH MG: 300 TABLET, FILM COATED, EXTENDED RELEASE ORAL at 08:00

## 2020-02-07 RX ADMIN — FERROUS SULFATE TAB 325 MG (65 MG ELEMENTAL FE) SCH MG: 325 (65 FE) TAB at 08:01

## 2020-02-07 RX ADMIN — INSULIN HUMAN PRN UNIT: 100 INJECTION, SOLUTION PARENTERAL at 08:35

## 2020-02-07 NOTE — NUR
GPS/RN CALLED [facility] [phone]. NO BODY ANSWERED. MESSAGE LEFT 
WITH GPS CALL BACK NUMBER.

## 2020-02-07 NOTE — NUR
DISCHARGE NOTE: Pt was discharged at 11:00am via [facility] transport to [facility] Address: [address] [place] [address] Phone: [phone]. Pts 
sister Bridgette [phone] has been notified and agreed with discharge plan. Pts mood was 
euthymic with congruent affect. Pt denied suicidal/homicidal ideation and denied 
visual/auditory hallucinations. Pt will be under the care of Psychiatrist: Dr. Lyudmila De Souza [address] [place] [address] [phone] and Internist: Dr. Umair Jurado Address: [address] [place] [address] Phone: [phone]. The 
multidisciplinary exitcare form was done, printed, signed, and given to the patient.

## 2020-02-07 NOTE — NUR
GPS RN:

PATIENT IS A 49 YEAR OLD  FEMALE DISCHARGED TO [facility]. 
PATIENT IS IN STABLE CONDITION. VSS. NO ACUTE DISTRESS NOTED. NO COMPLAINTS. COMPLIANT WITH 
MEDICATION MANAGEMENT. COOPERATIVE WITH PLAN OF CARE. PSYCHIATRIC TREATMENT PLANS MET. 
MEDICAL TREATMENT PLANS DEFERRED FOR CONTINUAL MONITORING. DENIES SI/HI VAH AT THE TIME OF 
DISCHARGE. SKIN CHECK DONE WITH WOUND PICTURES IN CHART. EDUCATED PATIENT ABOUT AFTERCARE 
WITH COPY PROVIDED. RETURNED PERSONAL BELONGINGS TO PATIENT. MEDICATIONS RECONCILED WITH DR BASSETT ALONG WITH PSYCHIATRIC DISCHARGE ORDERS. DISCHARGE PAPERWORK SIGNED. FOR FOLLOW UP 
WITH PSYCHIATRIST AND INTERNIST WITHIN 1 WEEK.  PATIENT LEFT THE [facility] GPS VIA [facility] 
TRANSPORT AT 1110.

## 2020-02-10 NOTE — NUR
FAMILY CONTACT: SW received a call from pts sister Bridgette [phone] called this morning 
to inform SW that she is going to report Nafisa,  at [facility] as she 
refused to take pt back on Friday once she was dropped off by [facility] and she sent pt to 
another hospital that same day. She said that the facility made things up to avoid admitting 
her and that she was never notified and did not know where pt was until the hospital caller 
her. She said they attempted to blame NAZ saying that SW never notified  of pts 
discharge and that she was not stable and also lied about what she was wearing. RADHA provided 
her with the [facility] Office phone number.